# Patient Record
Sex: MALE | Race: BLACK OR AFRICAN AMERICAN
[De-identification: names, ages, dates, MRNs, and addresses within clinical notes are randomized per-mention and may not be internally consistent; named-entity substitution may affect disease eponyms.]

---

## 2018-09-01 ENCOUNTER — HOSPITAL ENCOUNTER (INPATIENT)
Dept: HOSPITAL 61 - ER | Age: 63
LOS: 1 days | Discharge: HOME | DRG: 812 | End: 2018-09-02
Attending: INTERNAL MEDICINE | Admitting: INTERNAL MEDICINE
Payer: COMMERCIAL

## 2018-09-01 VITALS — SYSTOLIC BLOOD PRESSURE: 139 MMHG | DIASTOLIC BLOOD PRESSURE: 72 MMHG

## 2018-09-01 VITALS — WEIGHT: 210 LBS | HEIGHT: 74 IN | BODY MASS INDEX: 26.95 KG/M2

## 2018-09-01 VITALS — SYSTOLIC BLOOD PRESSURE: 127 MMHG | DIASTOLIC BLOOD PRESSURE: 75 MMHG

## 2018-09-01 VITALS — DIASTOLIC BLOOD PRESSURE: 56 MMHG | SYSTOLIC BLOOD PRESSURE: 132 MMHG

## 2018-09-01 VITALS — DIASTOLIC BLOOD PRESSURE: 63 MMHG | SYSTOLIC BLOOD PRESSURE: 115 MMHG

## 2018-09-01 VITALS — SYSTOLIC BLOOD PRESSURE: 133 MMHG | DIASTOLIC BLOOD PRESSURE: 74 MMHG

## 2018-09-01 VITALS — DIASTOLIC BLOOD PRESSURE: 64 MMHG | SYSTOLIC BLOOD PRESSURE: 116 MMHG

## 2018-09-01 VITALS — DIASTOLIC BLOOD PRESSURE: 70 MMHG | SYSTOLIC BLOOD PRESSURE: 125 MMHG

## 2018-09-01 VITALS — SYSTOLIC BLOOD PRESSURE: 135 MMHG | DIASTOLIC BLOOD PRESSURE: 69 MMHG

## 2018-09-01 VITALS — DIASTOLIC BLOOD PRESSURE: 62 MMHG | SYSTOLIC BLOOD PRESSURE: 118 MMHG

## 2018-09-01 VITALS — SYSTOLIC BLOOD PRESSURE: 115 MMHG | DIASTOLIC BLOOD PRESSURE: 63 MMHG

## 2018-09-01 VITALS — DIASTOLIC BLOOD PRESSURE: 72 MMHG | SYSTOLIC BLOOD PRESSURE: 121 MMHG

## 2018-09-01 VITALS — SYSTOLIC BLOOD PRESSURE: 158 MMHG | DIASTOLIC BLOOD PRESSURE: 79 MMHG

## 2018-09-01 VITALS — SYSTOLIC BLOOD PRESSURE: 133 MMHG | DIASTOLIC BLOOD PRESSURE: 71 MMHG

## 2018-09-01 VITALS — DIASTOLIC BLOOD PRESSURE: 61 MMHG | SYSTOLIC BLOOD PRESSURE: 110 MMHG

## 2018-09-01 VITALS — DIASTOLIC BLOOD PRESSURE: 57 MMHG | SYSTOLIC BLOOD PRESSURE: 120 MMHG

## 2018-09-01 DIAGNOSIS — I10: ICD-10-CM

## 2018-09-01 DIAGNOSIS — K21.9: ICD-10-CM

## 2018-09-01 DIAGNOSIS — Z96.642: ICD-10-CM

## 2018-09-01 DIAGNOSIS — D64.9: Primary | ICD-10-CM

## 2018-09-01 DIAGNOSIS — M96.840: ICD-10-CM

## 2018-09-01 DIAGNOSIS — M19.90: ICD-10-CM

## 2018-09-01 DIAGNOSIS — J40: ICD-10-CM
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
## 2018-09-01 LAB
ALBUMIN SERPL-MCNC: 2.9 G/DL (ref 3.4–5)
ALP SERPL-CCNC: 324 U/L (ref 46–116)
ALT SERPL-CCNC: 27 U/L (ref 16–63)
ANION GAP SERPL CALC-SCNC: 9 MMOL/L (ref 6–14)
APTT BLD: 34 SEC (ref 24–38)
APTT PPP: YELLOW S
AST SERPL-CCNC: 22 U/L (ref 15–37)
BACTERIA #/AREA URNS HPF: (no result) /HPF
BASOPHILS # BLD AUTO: 0.1 X10^3/UL (ref 0–0.2)
BASOPHILS NFR BLD: 1 % (ref 0–3)
BILIRUB DIRECT SERPL-MCNC: 0.3 MG/DL (ref 0–0.2)
BILIRUB SERPL-MCNC: 1 MG/DL (ref 0.2–1)
BILIRUB UR QL STRIP: NEGATIVE
BUN SERPL-MCNC: 10 MG/DL (ref 8–26)
CALCIUM SERPL-MCNC: 8.3 MG/DL (ref 8.5–10.1)
CHLORIDE SERPL-SCNC: 102 MMOL/L (ref 98–107)
CO2 SERPL-SCNC: 25 MMOL/L (ref 21–32)
CREAT SERPL-MCNC: 1.2 MG/DL (ref 0.7–1.3)
EOSINOPHIL NFR BLD: 0 % (ref 0–3)
EOSINOPHIL NFR BLD: 0 X10^3/UL (ref 0–0.7)
ERYTHROCYTE [DISTWIDTH] IN BLOOD BY AUTOMATED COUNT: 14.3 % (ref 11.5–14.5)
ERYTHROCYTE [DISTWIDTH] IN BLOOD BY AUTOMATED COUNT: 14.5 % (ref 11.5–14.5)
FECAL OB PT: POSITIVE
FIBRINOGEN PPP-MCNC: CLEAR MG/DL
GFR SERPLBLD BASED ON 1.73 SQ M-ARVRAT: 74.2 ML/MIN
GLUCOSE SERPL-MCNC: 112 MG/DL (ref 70–99)
HCT VFR BLD CALC: 16.4 % (ref 39–53)
HCT VFR BLD CALC: 21.6 % (ref 39–53)
HGB BLD-MCNC: 5.8 G/DL (ref 13–17.5)
HGB BLD-MCNC: 7.7 G/DL (ref 13–17.5)
LYMPHOCYTES # BLD: 1.2 X10^3/UL (ref 1–4.8)
LYMPHOCYTES NFR BLD AUTO: 13 % (ref 24–48)
MCH RBC QN AUTO: 31 PG (ref 25–35)
MCH RBC QN AUTO: 31 PG (ref 25–35)
MCHC RBC AUTO-ENTMCNC: 36 G/DL (ref 31–37)
MCHC RBC AUTO-ENTMCNC: 36 G/DL (ref 31–37)
MCV RBC AUTO: 87 FL (ref 79–100)
MCV RBC AUTO: 87 FL (ref 79–100)
MONO #: 1.6 X10^3/UL (ref 0–1.1)
MONOCYTES NFR BLD: 17 % (ref 0–9)
NEUT #: 6.9 X10^3UL (ref 1.8–7.7)
NEUTROPHILS NFR BLD AUTO: 70 % (ref 31–73)
NITRITE UR QL STRIP: POSITIVE
PH UR STRIP: 6.5 [PH]
PLATELET # BLD AUTO: 360 X10^3/UL (ref 140–400)
PLATELET # BLD AUTO: 372 X10^3/UL (ref 140–400)
POTASSIUM SERPL-SCNC: 3.9 MMOL/L (ref 3.5–5.1)
PROT SERPL-MCNC: 6.9 G/DL (ref 6.4–8.2)
PROT UR STRIP-MCNC: NEGATIVE MG/DL
PROTHROMBIN TIME: 14.9 SEC (ref 11.7–14)
RBC # BLD AUTO: 1.89 X10^6/UL (ref 4.3–5.7)
RBC # BLD AUTO: 2.49 X10^6/UL (ref 4.3–5.7)
RBC #/AREA URNS HPF: (no result) /HPF (ref 0–2)
SODIUM SERPL-SCNC: 136 MMOL/L (ref 136–145)
SQUAMOUS #/AREA URNS LPF: (no result) /LPF
UROBILINOGEN UR-MCNC: 4 MG/DL
WBC # BLD AUTO: 9.6 X10^3/UL (ref 4–11)
WBC # BLD AUTO: 9.8 X10^3/UL (ref 4–11)
WBC #/AREA URNS HPF: (no result) /HPF (ref 0–4)

## 2018-09-01 PROCEDURE — 87086 URINE CULTURE/COLONY COUNT: CPT

## 2018-09-01 PROCEDURE — 82607 VITAMIN B-12: CPT

## 2018-09-01 PROCEDURE — 87186 SC STD MICRODIL/AGAR DIL: CPT

## 2018-09-01 PROCEDURE — P9016 RBC LEUKOCYTES REDUCED: HCPCS

## 2018-09-01 PROCEDURE — 82274 ASSAY TEST FOR BLOOD FECAL: CPT

## 2018-09-01 PROCEDURE — 73700 CT LOWER EXTREMITY W/O DYE: CPT

## 2018-09-01 PROCEDURE — 80076 HEPATIC FUNCTION PANEL: CPT

## 2018-09-01 PROCEDURE — 86850 RBC ANTIBODY SCREEN: CPT

## 2018-09-01 PROCEDURE — 81001 URINALYSIS AUTO W/SCOPE: CPT

## 2018-09-01 PROCEDURE — 83550 IRON BINDING TEST: CPT

## 2018-09-01 PROCEDURE — 83540 ASSAY OF IRON: CPT

## 2018-09-01 PROCEDURE — 80048 BASIC METABOLIC PNL TOTAL CA: CPT

## 2018-09-01 PROCEDURE — 85610 PROTHROMBIN TIME: CPT

## 2018-09-01 PROCEDURE — 85730 THROMBOPLASTIN TIME PARTIAL: CPT

## 2018-09-01 PROCEDURE — 30233N1 TRANSFUSION OF NONAUTOLOGOUS RED BLOOD CELLS INTO PERIPHERAL VEIN, PERCUTANEOUS APPROACH: ICD-10-PCS | Performed by: INTERNAL MEDICINE

## 2018-09-01 PROCEDURE — 86140 C-REACTIVE PROTEIN: CPT

## 2018-09-01 PROCEDURE — 36415 COLL VENOUS BLD VENIPUNCTURE: CPT

## 2018-09-01 PROCEDURE — 86920 COMPATIBILITY TEST SPIN: CPT

## 2018-09-01 PROCEDURE — 85027 COMPLETE CBC AUTOMATED: CPT

## 2018-09-01 PROCEDURE — 86901 BLOOD TYPING SEROLOGIC RH(D): CPT

## 2018-09-01 PROCEDURE — 85025 COMPLETE CBC W/AUTO DIFF WBC: CPT

## 2018-09-01 PROCEDURE — 85651 RBC SED RATE NONAUTOMATED: CPT

## 2018-09-01 PROCEDURE — 85045 AUTOMATED RETICULOCYTE COUNT: CPT

## 2018-09-01 PROCEDURE — 86900 BLOOD TYPING SEROLOGIC ABO: CPT

## 2018-09-01 PROCEDURE — 80053 COMPREHEN METABOLIC PANEL: CPT

## 2018-09-01 PROCEDURE — 82746 ASSAY OF FOLIC ACID SERUM: CPT

## 2018-09-01 RX ADMIN — Medication SCH CAP: at 21:24

## 2018-09-01 NOTE — RAD
CT STUDY OF THE LEFT HIP AND LEFT FEMUR WITHOUT CONTRAST

 

Clinical indications: Left leg and left hip pain status post left hip 

replacement in July 2018. Patient has swelling in the thigh. Anemia.

 

TECHNIQUE: Noncontrast helical CT scanning of the left hip and left femur 

down to the level of the distal femoral metaphysis was performed. 

Multiplanar 2-D reconstructions were generated.

 

PQRS compliance Statement

 

One or more of the following individualized dose reduction techniques were

utilized for this study:

1.  Automated exposure control

2.  Adjustment of the mA and/or kV according to patient size

3.  Use of iterative reconstruction technique

 

COMPARISON: None available.

 

FINDINGS: A left hip arthroplasty is seen which is well aligned. There is 

metallic streaking artifact related to the arthroplasty. There is a 

nondisplaced comminuted spiral fracture of the proximal shaft of the left 

femur. A long femoral stem prosthesis is seen extending through the 

proximal left femoral shaft and intertrochanteric area down to the distal 

shaft level below the level of the fracture. Bone infarct is seen within 

the distal shaft of the left femur distal to the femoral stem prosthesis. 

4 cerclage wires are evident. Fracture fragments are seen anteriorly and 

medially. Some mild callus formation is seen around the upper portion of 

the fracture. Heterotopic soft tissue ossification is seen. There is a 

radiolucent lesion of the roof of the acetabulum anteriorly with 

associated soft tissue component. This lesion measures 4.1 cm transversely

and 2.4 cm in AP dimension and 1.9 cm in vertical dimension including the 

soft tissue component.. There is myositis ossificans laterally within the 

proximal left thigh which measures 10.8 cm in vertical dimension. There is

a fluid collection measuring 8 cm in length just lateral to the greater 

trochanter. This is consistent with greater trochanteric bursitis or 

postoperative hematoma or seroma. There is lateral subcutaneous soft 

tissue edema which may be related to the recent surgery. On images 68-90 

and series 2 and images 38-46 and series 4, there is an ill-defined 

hypodensity present within the biceps femoris muscle. This could be 

related to the streaking artifact from the prosthesis or could be due to 

an intramuscular hematoma related to muscle injury. This is seen at the 

mid thigh level.

 

IMPRESSION: Left hip prosthesis with a long femoral stem. The long femoral

stem and cerclage wires fixates and reduces a comminuted left femoral 

shaft fracture. Mild healing callus formation is evident. The fracture is 

not yet healed.

 

Fluid collection lateral to the greater trochanter consistent greater 

trochanteric bursitis or could represent postoperative hematoma or seroma.

Infection of this fluid collection or abscess cannot be determined by CT.

 

Ill-defined hypodense area within the biceps Glover muscle the mid thigh 

area. This could be related to streaking artifact from the metal 

prosthesis or could represent an intramuscular hematoma secondary to 

muscle injury. Clinical correlation with this area is recommended.

 

Myositis ossificans laterally.

 

Radiolucent lesion of the roof of the acetabulum. Differential 

considerations include metastatic disease or myeloma or infection or 

loosening.

 

Electronically signed by: Dayday Salazar MD (9/1/2018 11:45 AM) [facility]

## 2018-09-01 NOTE — PDOC
Provider Note


Provider Note


Consult noted, labs, chart and CT reviewed. Left hip surgery in July at .  CT 

shows long stem JUDY and femur fracture stabilized with cerclage wires.  lateral 

hematoma on CT series 4 image 39.  Significant anemia hgb 5.8, with 

transfusions planned. Will check CRP and ESR.  Doubtful infection or need for 

ortho surgery but will discuss with him tomorrow with lab results.











KIRILL BERNSTEIN MD Sep 1, 2018 12:56

## 2018-09-01 NOTE — PHYS DOC
Past Medical History


Past Medical History:  Hypertension


Past Surgical History:  Hip Replacement, Other


Additional Past Surgical Histo:  back surgery


Alcohol Use:  Occasionally


Drug Use:  None





Adult General


Chief Complaint


Chief Complaint:  ABNORMAL LABS





[facility]


HPI





Patient is a 63  year old male who presents with anemia.  Patient is status 

post left hip replacement which was completed in July. Since the surgery, the 

patient has had repeated episodes of anemia. He has required blood 

transfusions. Today, he was referred to come to the emergency department for 

documented low blood count. He had labs collected yesterday and his hemoglobin 

was reportedly 6. Patient has no complaints. No pain. No palpitations, 

lightheadedness. No fever. He does complain however of some mild soft tissue 

swelling about the incision on the left lateral thigh.  Denies melena or 

hematochezia.





Review of Systems


Review of Systems





Constitutional: Denies fever or chills


Eyes: Denies change in visual acuity, redness


HENT: Denies nasal congestion or sore throat


Respiratory: Denies cough or shortness of breath 


Cardiovascular: No additional information not addressed in HPI 


GI: Denies abdominal pain, nausea


: Denies dysuria or hematuria


Musculoskeletal: Denies back pain 


Integument: Denies rash or skin lesions 


Neurologic: Denies headache


Endocrine: Denies polyuria





All other systems were reviewed and found to be within normal limits, except as 

documented in this note.





Allergies


Allergies





Allergies








Coded Allergies Type Severity Reaction Last Updated Verified


 


  No Known Drug Allergies    9/1/18 No











Physical Exam


Physical Exam





Constitutional: Well developed, well nourished, no acute distress


HENT: Normocephalic, atraumatic, bilateral external ears normal, oropharynx 

moist, no oral exudates


Eyes: PERRLA, EOMI, conjunctiva pale


Neck: Normal range of motion, no tenderness 


Cardiovascular:Heart rate regular rhythm, no murmur 


Lungs & Thorax:  Bilateral breath sounds clear to auscultation


Abdomen: Bowel sounds normal, soft 


Skin: Warm, dry, no erythema,   


Extremities: Well-healing incision over the lateral left thigh. There is some 

soft tissue firmness about the incision but no overt fluctuant areas. No 

erythema. No dehiscence or drainage. 


Neurologic: Alert and oriented X 3, normal motor function, normal sensory 

function


Psychologic: Affect normal, judgement normal, mood normal





Current Patient Data


Vital Signs





 Vital Signs








  Date Time  Temp Pulse Resp B/P (MAP) Pulse Ox O2 Delivery O2 Flow Rate FiO2


 


9/1/18 09:18 99.2 93 12 120/62 (81) 96 Room Air  





 99.2       








Lab Values





 Laboratory Tests








Test


 9/1/18


09:36


 


White Blood Count


 9.8 x10^3/uL


(4.0-11.0)


 


Red Blood Count


 1.89 x10^6/uL


(4.30-5.70)  L


 


Hemoglobin


 5.8 g/dL


(13.0-17.5)  *L


 


Hematocrit


 16.4 %


(39.0-53.0)  *L


 


Mean Corpuscular Volume


 87 fL ()





 


Mean Corpuscular Hemoglobin 31 pg (25-35)  


 


Mean Corpuscular Hemoglobin


Concent 36 g/dL


(31-37)


 


Red Cell Distribution Width


 14.3 %


(11.5-14.5)


 


Platelet Count


 372 x10^3/uL


(140-400)


 


Neutrophils (%) (Auto) 70 % (31-73)  


 


Lymphocytes (%) (Auto) 13 % (24-48)  L


 


Monocytes (%) (Auto) 17 % (0-9)  H


 


Eosinophils (%) (Auto) 0 % (0-3)  


 


Basophils (%) (Auto) 1 % (0-3)  


 


Neutrophils # (Auto)


 6.9 x10^3uL


(1.8-7.7)


 


Lymphocytes # (Auto)


 1.2 x10^3/uL


(1.0-4.8)


 


Monocytes # (Auto)


 1.6 x10^3/uL


(0.0-1.1)  H


 


Eosinophils # (Auto)


 0.0 x10^3/uL


(0.0-0.7)


 


Basophils # (Auto)


 0.1 x10^3/uL


(0.0-0.2)


 


Prothrombin Time


 14.9 SEC


(11.7-14.0)  H


 


Prothrombin Time INR


 1.2 (0.8-1.1)


H


 


PTT


 34 SEC (24-38)





 


Sodium Level


 136 mmol/L


(136-145)


 


Potassium Level


 3.9 mmol/L


(3.5-5.1)


 


Chloride Level


 102 mmol/L


()


 


Carbon Dioxide Level


 25 mmol/L


(21-32)


 


Anion Gap 9 (6-14)  


 


Blood Urea Nitrogen


 10 mg/dL


(8-26)


 


Creatinine


 1.2 mg/dL


(0.7-1.3)


 


Estimated GFR


(Cockcroft-Gault) 74.2  





 


Glucose Level


 112 mg/dL


(70-99)  H


 


Calcium Level


 8.3 mg/dL


(8.5-10.1)  L


 


Total Bilirubin


 1.0 mg/dL


(0.2-1.0)


 


Direct Bilirubin


 0.3 mg/dL


(0.0-0.2)  H


 


Aspartate Amino Transferase


(AST) 22 U/L (15-37)





 


Alanine Aminotransferase (ALT)


 27 U/L (16-63)





 


Alkaline Phosphatase


 324 U/L


()  H


 


Total Protein


 6.9 g/dL


(6.4-8.2)


 


Albumin


 2.9 g/dL


(3.4-5.0)  L





 Laboratory Tests


9/1/18 09:36








 Laboratory Tests


9/1/18 09:36














EKG


EKG


[]





Radiology/Procedures


Radiology/Procedures


[]





Course & Med Decision Making


Course & Med Decision Making


Pertinent Labs and Imaging studies reviewed. (See chart for details)





Patient was seen and evaluated in the ER for anemia.  He was originally 

referred to the hospital for outpatient blood transfusion which is not an 

option on the weekend.  Subsequently, he will be admitted to the hospital. His 

hemoglobin is documented above and is low. Patient is relatively asymptomatic. 

He was noted to have positive guaiac test on his stool, which she had 

previously not been aware of. GI consultation is placed. Patient is agreeable 

to the plan of care. Orders are placed for 2 units of packed red blood cells to 

be transfused.  Discussed with Dr. Elizabeth who will primarily admit the patient.





Dragon Disclaimer


Dragon Disclaimer


This electronic medical record was generated, in whole or in part, using a 

voice recognition dictation system.





Departure


Departure


Referrals:  


BOBBY GUERRA MD (PCP)











LORENA PERERA DO Sep 1, 2018 10:02

## 2018-09-01 NOTE — PDOC2
CONSULT


Date of Consult


Date of Consult


DATE: 9/1/18 


TIME: 12:16





Reason for Consult


Reason for Consult:


Anemia s/p hip replacement





Current Problem List


Problem List


Problems


Medical Problems:


(1) Anemia


Status: Acute  





(2) GI bleed


Status: Acute  





(3) Urinary tract infection


Status: Acute  











Current Medications


Current Medications





Current Medications


Acetaminophen (Tylenol) 1,000 mg 1X  ONCE PO  Last administered on 9/1/18at 10:

38;  Start 9/1/18 at 10:45;  Stop 9/1/18 at 10:46;  Status DC


Ondansetron HCl (Zofran) 4 mg PRN Q8HRS  PRN IV NAUSEA/VOMITING;  Start 9/1/18 

at 11:15;  Stop 9/1/18 at 12:04;  Status DC


Acetaminophen (Tylenol) 650 mg PRN Q4HRS  PRN PO FEVER;  Start 9/1/18 at 11:15;

  Stop 9/2/18 at 11:14


Levofloxacin (Levaquin) 500 mg DAILY06 PO ;  Start 9/1/18 at 12:00;  Stop 9/1/ 18 at 12:04;  Status DC


Lactobacillus Rhamnosus (Culturelle) 1 cap BID PO ;  Start 9/1/18 at 21:00


Ondansetron HCl (Zofran) 4 mg PRN Q6HRS  PRN IV NAUSEA/VOMITING;  Start 9/1/18 

at 12:15;  Status UNV


Levofloxacin/ Dextrose (Levaquin Per Pharmacy) 1 each PRN DAILY  PRN MC SEE 

COMMENTS;  Start 9/1/18 at 12:15;  Status UNV


Morphine Sulfate (Morphine Sulfate) 2 mg PRN Q2HR  PRN IV PAIN;  Start 9/1/18 

at 12:15;  Status UNV


Acetaminophen/ Hydrocodone Bitart (Lortab 5/325) 1 tab PRN Q4HRS  PRN PO PAIN;  

Start 9/1/18 at 12:15;  Status UNV


Diphenhydramine HCl (Benadryl) 25 mg PRN QHS  PRN PO INSOMNIA;  Start 9/1/18 at 

12:15;  Status UNV





Allergies


Allergies:  


Coded Allergies:  


     No Known Drug Allergies (Unverified , 9/1/18)





Vitals


VITALS





Vital Signs








  Date Time  Temp Pulse Resp B/P (MAP) Pulse Ox O2 Delivery O2 Flow Rate FiO2


 


9/1/18 11:26 98.8 88 20 116/64    





 98.8       


 


9/1/18 11:18     94 Room Air  











Labs


Labs





Laboratory Tests








Test


 9/1/18


09:36 9/1/18


10:27 9/1/18


10:41


 


White Blood Count


 9.8 x10^3/uL


(4.0-11.0) 


 





 


Red Blood Count


 1.89 x10^6/uL


(4.30-5.70) 


 





 


Hemoglobin


 5.8 g/dL


(13.0-17.5) 


 





 


Hematocrit


 16.4 %


(39.0-53.0) 


 





 


Mean Corpuscular Volume 87 fL ()   


 


Mean Corpuscular Hemoglobin 31 pg (25-35)   


 


Mean Corpuscular Hemoglobin


Concent 36 g/dL


(31-37) 


 





 


Red Cell Distribution Width


 14.3 %


(11.5-14.5) 


 





 


Platelet Count


 372 x10^3/uL


(140-400) 


 





 


Neutrophils (%) (Auto) 70 % (31-73)   


 


Lymphocytes (%) (Auto) 13 % (24-48)   


 


Monocytes (%) (Auto) 17 % (0-9)   


 


Eosinophils (%) (Auto) 0 % (0-3)   


 


Basophils (%) (Auto) 1 % (0-3)   


 


Neutrophils # (Auto)


 6.9 x10^3uL


(1.8-7.7) 


 





 


Lymphocytes # (Auto)


 1.2 x10^3/uL


(1.0-4.8) 


 





 


Monocytes # (Auto)


 1.6 x10^3/uL


(0.0-1.1) 


 





 


Eosinophils # (Auto)


 0.0 x10^3/uL


(0.0-0.7) 


 





 


Basophils # (Auto)


 0.1 x10^3/uL


(0.0-0.2) 


 





 


Prothrombin Time


 14.9 SEC


(11.7-14.0) 


 





 


Prothromb Time International


Ratio 1.2 (0.8-1.1) 


 


 





 


Activated Partial


Thromboplast Time 34 SEC (24-38) 


 


 





 


Sodium Level


 136 mmol/L


(136-145) 


 





 


Potassium Level


 3.9 mmol/L


(3.5-5.1) 


 





 


Chloride Level


 102 mmol/L


() 


 





 


Carbon Dioxide Level


 25 mmol/L


(21-32) 


 





 


Anion Gap 9 (6-14)   


 


Blood Urea Nitrogen


 10 mg/dL


(8-26) 


 





 


Creatinine


 1.2 mg/dL


(0.7-1.3) 


 





 


Estimated GFR


(Cockcroft-Gault) 74.2 


 


 





 


Glucose Level


 112 mg/dL


(70-99) 


 





 


Calcium Level


 8.3 mg/dL


(8.5-10.1) 


 





 


Total Bilirubin


 1.0 mg/dL


(0.2-1.0) 


 





 


Direct Bilirubin


 0.3 mg/dL


(0.0-0.2) 


 





 


Aspartate Amino Transf


(AST/SGOT) 22 U/L (15-37) 


 


 





 


Alanine Aminotransferase


(ALT/SGPT) 27 U/L (16-63) 


 


 





 


Alkaline Phosphatase


 324 U/L


() 


 





 


Total Protein


 6.9 g/dL


(6.4-8.2) 


 





 


Albumin


 2.9 g/dL


(3.4-5.0) 


 





 


Urine Collection Type  Void  


 


Urine Color  Yellow  


 


Urine Clarity  Clear  


 


Urine pH  6.5  


 


Urine Specific Gravity  1.010  


 


Urine Protein


 


 Negative mg/dL


(NEG-TRACE) 





 


Urine Glucose (UA)


 


 Negative mg/dL


(NEG) 





 


Urine Ketones (Stick)


 


 Negative mg/dL


(NEG) 





 


Urine Blood  Negative (NEG)  


 


Urine Nitrite  Positive (NEG)  


 


Urine Bilirubin  Negative (NEG)  


 


Urine Urobilinogen Dipstick


 


 4.0 mg/dL (0.2


mg/dL) 





 


Urine Leukocyte Esterase  Moderate (NEG)  


 


Urine RBC  Occ /HPF (0-2)  


 


Urine WBC


 


 20-40 /HPF


(0-4) 





 


Urine Squamous Epithelial


Cells 


 Few /LPF 


 





 


Urine Bacteria


 


 Many /HPF


(0-FEW) 





 


Stool Occult Blood   Positive (NEG) 








Laboratory Tests








Test


 9/1/18


09:36 9/1/18


10:27 9/1/18


10:41


 


White Blood Count


 9.8 x10^3/uL


(4.0-11.0) 


 





 


Red Blood Count


 1.89 x10^6/uL


(4.30-5.70) 


 





 


Hemoglobin


 5.8 g/dL


(13.0-17.5) 


 





 


Hematocrit


 16.4 %


(39.0-53.0) 


 





 


Mean Corpuscular Volume 87 fL ()   


 


Mean Corpuscular Hemoglobin 31 pg (25-35)   


 


Mean Corpuscular Hemoglobin


Concent 36 g/dL


(31-37) 


 





 


Red Cell Distribution Width


 14.3 %


(11.5-14.5) 


 





 


Platelet Count


 372 x10^3/uL


(140-400) 


 





 


Neutrophils (%) (Auto) 70 % (31-73)   


 


Lymphocytes (%) (Auto) 13 % (24-48)   


 


Monocytes (%) (Auto) 17 % (0-9)   


 


Eosinophils (%) (Auto) 0 % (0-3)   


 


Basophils (%) (Auto) 1 % (0-3)   


 


Neutrophils # (Auto)


 6.9 x10^3uL


(1.8-7.7) 


 





 


Lymphocytes # (Auto)


 1.2 x10^3/uL


(1.0-4.8) 


 





 


Monocytes # (Auto)


 1.6 x10^3/uL


(0.0-1.1) 


 





 


Eosinophils # (Auto)


 0.0 x10^3/uL


(0.0-0.7) 


 





 


Basophils # (Auto)


 0.1 x10^3/uL


(0.0-0.2) 


 





 


Prothrombin Time


 14.9 SEC


(11.7-14.0) 


 





 


Prothromb Time International


Ratio 1.2 (0.8-1.1) 


 


 





 


Activated Partial


Thromboplast Time 34 SEC (24-38) 


 


 





 


Sodium Level


 136 mmol/L


(136-145) 


 





 


Potassium Level


 3.9 mmol/L


(3.5-5.1) 


 





 


Chloride Level


 102 mmol/L


() 


 





 


Carbon Dioxide Level


 25 mmol/L


(21-32) 


 





 


Anion Gap 9 (6-14)   


 


Blood Urea Nitrogen


 10 mg/dL


(8-26) 


 





 


Creatinine


 1.2 mg/dL


(0.7-1.3) 


 





 


Estimated GFR


(Cockcroft-Gault) 74.2 


 


 





 


Glucose Level


 112 mg/dL


(70-99) 


 





 


Calcium Level


 8.3 mg/dL


(8.5-10.1) 


 





 


Total Bilirubin


 1.0 mg/dL


(0.2-1.0) 


 





 


Direct Bilirubin


 0.3 mg/dL


(0.0-0.2) 


 





 


Aspartate Amino Transf


(AST/SGOT) 22 U/L (15-37) 


 


 





 


Alanine Aminotransferase


(ALT/SGPT) 27 U/L (16-63) 


 


 





 


Alkaline Phosphatase


 324 U/L


() 


 





 


Total Protein


 6.9 g/dL


(6.4-8.2) 


 





 


Albumin


 2.9 g/dL


(3.4-5.0) 


 





 


Urine Collection Type  Void  


 


Urine Color  Yellow  


 


Urine Clarity  Clear  


 


Urine pH  6.5  


 


Urine Specific Gravity  1.010  


 


Urine Protein


 


 Negative mg/dL


(NEG-TRACE) 





 


Urine Glucose (UA)


 


 Negative mg/dL


(NEG) 





 


Urine Ketones (Stick)


 


 Negative mg/dL


(NEG) 





 


Urine Blood  Negative (NEG)  


 


Urine Nitrite  Positive (NEG)  


 


Urine Bilirubin  Negative (NEG)  


 


Urine Urobilinogen Dipstick


 


 4.0 mg/dL (0.2


mg/dL) 





 


Urine Leukocyte Esterase  Moderate (NEG)  


 


Urine RBC  Occ /HPF (0-2)  


 


Urine WBC


 


 20-40 /HPF


(0-4) 





 


Urine Squamous Epithelial


Cells 


 Few /LPF 


 





 


Urine Bacteria


 


 Many /HPF


(0-FEW) 





 


Stool Occult Blood   Positive (NEG) 











Assessment/Plan


Assessment/Plan


Anemia- etiology to be determined. Gastric/colon cancer, AVMS, celaic disease, 

and/or IBD with weight loss in differential as well as marrow dysfunction


Plan transfuse 3 units PRBC


       advance diet


       await b12/retic/iron/foalte stores


       heme consult pending above. Possible Bm biopsy and/or endoscopy to 

further assess


Full note dictated











ROHINI DENTON MD Sep 1, 2018 12:19

## 2018-09-01 NOTE — PDOC1
History and Physical


Date of Admission


Date of Admission


DATE: 9/1/18 


TIME: 12:04





Identification/Chief Complaint


Chief Complaint


Black stools





Source


Source:  Caregiver, Chart review, Patient





History of Present Illness


History of Present Illness


Pleasant 63-year-old African-American male, recent hip surgery left at  some 

2 months ago. Loots of blood loss nory/intra op, required multiple blood 

transfusions at , comes in because of black stools with a hemoglobin of 5.8 

on admission. GI consulted, Hemoccult positive. To transfuse 3 packed RBCs. 

Possible need scope in house during this admission. So far blood pressure and 

heart rate good. No abdominal pain. CAT scan of the left pelvis hip was done, 

he has been complaining of some increased pain there. He is healing well on the 

outside, but there is a mention of postop hematoma or seroma seen on CAT scan. 

Hence I'm consulting orthopedics. GI Was consulted.


Follows with Dr. Claire Humphrey for the anemia. We'll consult the same group 

otherwise. Otherwise just takes only Tylenol at home, not on any NSAIDs. No 

cardio- pulmonary problems. Nonsmoker nonalcoholic drinker


No known drug allergies, AMbulates with a cane at home since the hip fx.





Past Medical History


Cardiovascular:  No pertinent hx


Pulmonary:  Bronchitis


GI:  No pertinent hx


Heme/Onc:  No pertinent hx


Hepatobiliary:  No pertinent hx


Musculoskeletal:  Osteoarthritis





Past Surgical History


Past Surgical History:  Total hip replacement





Family History


Family History:  No Significant





Social History


Smoke:  No


ALCOHOL:  none


Drugs:  None





Current Problem List


Problem List


Problems


Medical Problems:


(1) Anemia


Status: Acute  





(2) GI bleed


Status: Acute  





(3) Urinary tract infection


Status: Acute  











Current Medications


Current Medications





Current Medications


Acetaminophen (Tylenol) 1,000 mg 1X  ONCE PO  Last administered on 9/1/18at 10:

38;  Start 9/1/18 at 10:45;  Stop 9/1/18 at 10:46;  Status DC


Ondansetron HCl (Zofran) 4 mg PRN Q8HRS  PRN IV NAUSEA/VOMITING;  Start 9/1/18 

at 11:15;  Stop 9/2/18 at 11:14


Acetaminophen (Tylenol) 650 mg PRN Q4HRS  PRN PO FEVER;  Start 9/1/18 at 11:15;

  Stop 9/2/18 at 11:14


Levofloxacin (Levaquin) 500 mg DAILY06 PO ;  Start 9/1/18 at 12:00


Lactobacillus Rhamnosus (Culturelle) 1 cap BID PO ;  Start 9/1/18 at 21:00





Allergies


Allergies:  


Coded Allergies:  


     No Known Drug Allergies (Unverified , 9/1/18)





ROS


Review of System


Left hip pain, black stools, the rest of ROS 14 point negative





Physical Exam


General:  Alert, Oriented X3, Cooperative, No acute distress


HEENT:  Atraumatic, PERRLA, EOMI, Other (pale palpebral conjunctivae)


Lungs:  Clear to auscultation


Heart:  S1S2, RRR, no thrills, no rubs, no gallops, no murmurs


Cardiovascular:  S1, S2


Abdomen:  Normal bowel sounds, Soft, No tenderness, No hepatosplenomegaly, No 

masses


Male Genitals Exam:  normal genitalia, normal prostate


PELVIC:  Other (left hip, scar, healing well)


Extremities:  No clubbing, No cyanosis, No edema


Skin:  No rashes, No breakdown, No significant lesion


Neuro:  Normal gait, Normal speech, Strength at 5/5 X4 ext, Normal tone, 

Sensation intact, Cranial nerves 3-12 NL, Reflexes 2+


Psych/Mental Status:  Mental status NL, Mood NL





Vitals


Vitals





Vital Signs








  Date Time  Temp Pulse Resp B/P (MAP) Pulse Ox O2 Delivery O2 Flow Rate FiO2


 


9/1/18 11:26 98.8 88 20 116/64    





 98.8       


 


9/1/18 11:18     94 Room Air  











Labs


Labs





Laboratory Tests








Test


 9/1/18


09:36 9/1/18


10:27 9/1/18


10:41


 


White Blood Count


 9.8 x10^3/uL


(4.0-11.0) 


 





 


Red Blood Count


 1.89 x10^6/uL


(4.30-5.70) 


 





 


Hemoglobin


 5.8 g/dL


(13.0-17.5) 


 





 


Hematocrit


 16.4 %


(39.0-53.0) 


 





 


Mean Corpuscular Volume 87 fL ()   


 


Mean Corpuscular Hemoglobin 31 pg (25-35)   


 


Mean Corpuscular Hemoglobin


Concent 36 g/dL


(31-37) 


 





 


Red Cell Distribution Width


 14.3 %


(11.5-14.5) 


 





 


Platelet Count


 372 x10^3/uL


(140-400) 


 





 


Neutrophils (%) (Auto) 70 % (31-73)   


 


Lymphocytes (%) (Auto) 13 % (24-48)   


 


Monocytes (%) (Auto) 17 % (0-9)   


 


Eosinophils (%) (Auto) 0 % (0-3)   


 


Basophils (%) (Auto) 1 % (0-3)   


 


Neutrophils # (Auto)


 6.9 x10^3uL


(1.8-7.7) 


 





 


Lymphocytes # (Auto)


 1.2 x10^3/uL


(1.0-4.8) 


 





 


Monocytes # (Auto)


 1.6 x10^3/uL


(0.0-1.1) 


 





 


Eosinophils # (Auto)


 0.0 x10^3/uL


(0.0-0.7) 


 





 


Basophils # (Auto)


 0.1 x10^3/uL


(0.0-0.2) 


 





 


Prothrombin Time


 14.9 SEC


(11.7-14.0) 


 





 


Prothromb Time International


Ratio 1.2 (0.8-1.1) 


 


 





 


Activated Partial


Thromboplast Time 34 SEC (24-38) 


 


 





 


Sodium Level


 136 mmol/L


(136-145) 


 





 


Potassium Level


 3.9 mmol/L


(3.5-5.1) 


 





 


Chloride Level


 102 mmol/L


() 


 





 


Carbon Dioxide Level


 25 mmol/L


(21-32) 


 





 


Anion Gap 9 (6-14)   


 


Blood Urea Nitrogen


 10 mg/dL


(8-26) 


 





 


Creatinine


 1.2 mg/dL


(0.7-1.3) 


 





 


Estimated GFR


(Cockcroft-Gault) 74.2 


 


 





 


Glucose Level


 112 mg/dL


(70-99) 


 





 


Calcium Level


 8.3 mg/dL


(8.5-10.1) 


 





 


Total Bilirubin


 1.0 mg/dL


(0.2-1.0) 


 





 


Direct Bilirubin


 0.3 mg/dL


(0.0-0.2) 


 





 


Aspartate Amino Transf


(AST/SGOT) 22 U/L (15-37) 


 


 





 


Alanine Aminotransferase


(ALT/SGPT) 27 U/L (16-63) 


 


 





 


Alkaline Phosphatase


 324 U/L


() 


 





 


Total Protein


 6.9 g/dL


(6.4-8.2) 


 





 


Albumin


 2.9 g/dL


(3.4-5.0) 


 





 


Urine Collection Type  Void  


 


Urine Color  Yellow  


 


Urine Clarity  Clear  


 


Urine pH  6.5  


 


Urine Specific Gravity  1.010  


 


Urine Protein


 


 Negative mg/dL


(NEG-TRACE) 





 


Urine Glucose (UA)


 


 Negative mg/dL


(NEG) 





 


Urine Ketones (Stick)


 


 Negative mg/dL


(NEG) 





 


Urine Blood  Negative (NEG)  


 


Urine Nitrite  Positive (NEG)  


 


Urine Bilirubin  Negative (NEG)  


 


Urine Urobilinogen Dipstick


 


 4.0 mg/dL (0.2


mg/dL) 





 


Urine Leukocyte Esterase  Moderate (NEG)  


 


Urine RBC  Occ /HPF (0-2)  


 


Urine WBC


 


 20-40 /HPF


(0-4) 





 


Urine Squamous Epithelial


Cells 


 Few /LPF 


 





 


Urine Bacteria


 


 Many /HPF


(0-FEW) 





 


Stool Occult Blood   Positive (NEG) 








Laboratory Tests








Test


 9/1/18


09:36 9/1/18


10:27 9/1/18


10:41


 


White Blood Count


 9.8 x10^3/uL


(4.0-11.0) 


 





 


Red Blood Count


 1.89 x10^6/uL


(4.30-5.70) 


 





 


Hemoglobin


 5.8 g/dL


(13.0-17.5) 


 





 


Hematocrit


 16.4 %


(39.0-53.0) 


 





 


Mean Corpuscular Volume 87 fL ()   


 


Mean Corpuscular Hemoglobin 31 pg (25-35)   


 


Mean Corpuscular Hemoglobin


Concent 36 g/dL


(31-37) 


 





 


Red Cell Distribution Width


 14.3 %


(11.5-14.5) 


 





 


Platelet Count


 372 x10^3/uL


(140-400) 


 





 


Neutrophils (%) (Auto) 70 % (31-73)   


 


Lymphocytes (%) (Auto) 13 % (24-48)   


 


Monocytes (%) (Auto) 17 % (0-9)   


 


Eosinophils (%) (Auto) 0 % (0-3)   


 


Basophils (%) (Auto) 1 % (0-3)   


 


Neutrophils # (Auto)


 6.9 x10^3uL


(1.8-7.7) 


 





 


Lymphocytes # (Auto)


 1.2 x10^3/uL


(1.0-4.8) 


 





 


Monocytes # (Auto)


 1.6 x10^3/uL


(0.0-1.1) 


 





 


Eosinophils # (Auto)


 0.0 x10^3/uL


(0.0-0.7) 


 





 


Basophils # (Auto)


 0.1 x10^3/uL


(0.0-0.2) 


 





 


Prothrombin Time


 14.9 SEC


(11.7-14.0) 


 





 


Prothromb Time International


Ratio 1.2 (0.8-1.1) 


 


 





 


Activated Partial


Thromboplast Time 34 SEC (24-38) 


 


 





 


Sodium Level


 136 mmol/L


(136-145) 


 





 


Potassium Level


 3.9 mmol/L


(3.5-5.1) 


 





 


Chloride Level


 102 mmol/L


() 


 





 


Carbon Dioxide Level


 25 mmol/L


(21-32) 


 





 


Anion Gap 9 (6-14)   


 


Blood Urea Nitrogen


 10 mg/dL


(8-26) 


 





 


Creatinine


 1.2 mg/dL


(0.7-1.3) 


 





 


Estimated GFR


(Cockcroft-Gault) 74.2 


 


 





 


Glucose Level


 112 mg/dL


(70-99) 


 





 


Calcium Level


 8.3 mg/dL


(8.5-10.1) 


 





 


Total Bilirubin


 1.0 mg/dL


(0.2-1.0) 


 





 


Direct Bilirubin


 0.3 mg/dL


(0.0-0.2) 


 





 


Aspartate Amino Transf


(AST/SGOT) 22 U/L (15-37) 


 


 





 


Alanine Aminotransferase


(ALT/SGPT) 27 U/L (16-63) 


 


 





 


Alkaline Phosphatase


 324 U/L


() 


 





 


Total Protein


 6.9 g/dL


(6.4-8.2) 


 





 


Albumin


 2.9 g/dL


(3.4-5.0) 


 





 


Urine Collection Type  Void  


 


Urine Color  Yellow  


 


Urine Clarity  Clear  


 


Urine pH  6.5  


 


Urine Specific Gravity  1.010  


 


Urine Protein


 


 Negative mg/dL


(NEG-TRACE) 





 


Urine Glucose (UA)


 


 Negative mg/dL


(NEG) 





 


Urine Ketones (Stick)


 


 Negative mg/dL


(NEG) 





 


Urine Blood  Negative (NEG)  


 


Urine Nitrite  Positive (NEG)  


 


Urine Bilirubin  Negative (NEG)  


 


Urine Urobilinogen Dipstick


 


 4.0 mg/dL (0.2


mg/dL) 





 


Urine Leukocyte Esterase  Moderate (NEG)  


 


Urine RBC  Occ /HPF (0-2)  


 


Urine WBC


 


 20-40 /HPF


(0-4) 





 


Urine Squamous Epithelial


Cells 


 Few /LPF 


 





 


Urine Bacteria


 


 Many /HPF


(0-FEW) 





 


Stool Occult Blood   Positive (NEG) 











VTE Prophylaxis Ordered


VTE Prophylaxis Devices:  Contraindicated


VTE Pharmacological Prophylaxi:  Contraindicated





Assessment/Plan


Assessment/Plan


Acute precipitous drop in hemoglobin


Hemoccult-positive


Melena


Recent hip surgery 2 months ago


Postop seroma or hematoma left hip


Abnormal CT leg, rule out metastatic process





PLAN:


Admit CVC


Transfuse 3 pRBC


Hemoglobin tomorrow


GI consulted


PPI


ortho consult for the above abnormal CT leg


Heme onc consulted for the abnormal CT leg with significant anemia, rule out 

metastatic process


Tylenol only is his home medication


PT OT


Discussed with the whole family and RN at bedside











FLORIDA RUELAS MD Sep 1, 2018 12:04

## 2018-09-02 VITALS — SYSTOLIC BLOOD PRESSURE: 130 MMHG | DIASTOLIC BLOOD PRESSURE: 75 MMHG

## 2018-09-02 VITALS — DIASTOLIC BLOOD PRESSURE: 87 MMHG | SYSTOLIC BLOOD PRESSURE: 144 MMHG

## 2018-09-02 VITALS — SYSTOLIC BLOOD PRESSURE: 124 MMHG | DIASTOLIC BLOOD PRESSURE: 68 MMHG

## 2018-09-02 VITALS — SYSTOLIC BLOOD PRESSURE: 128 MMHG | DIASTOLIC BLOOD PRESSURE: 75 MMHG

## 2018-09-02 LAB
ALBUMIN SERPL-MCNC: 2.6 G/DL (ref 3.4–5)
ALBUMIN/GLOB SERPL: 0.7 {RATIO} (ref 1–1.7)
ALP SERPL-CCNC: 295 U/L (ref 46–116)
ALT SERPL-CCNC: 26 U/L (ref 16–63)
ANION GAP SERPL CALC-SCNC: 8 MMOL/L (ref 6–14)
AST SERPL-CCNC: 22 U/L (ref 15–37)
BASOPHILS # BLD AUTO: 0 X10^3/UL (ref 0–0.2)
BASOPHILS NFR BLD: 0 % (ref 0–3)
BILIRUB SERPL-MCNC: 0.7 MG/DL (ref 0.2–1)
BUN SERPL-MCNC: 12 MG/DL (ref 8–26)
BUN/CREAT SERPL: 11 (ref 6–20)
CALCIUM SERPL-MCNC: 8.1 MG/DL (ref 8.5–10.1)
CHLORIDE SERPL-SCNC: 106 MMOL/L (ref 98–107)
CO2 SERPL-SCNC: 24 MMOL/L (ref 21–32)
CREAT SERPL-MCNC: 1.1 MG/DL (ref 0.7–1.3)
EOSINOPHIL NFR BLD: 0.1 X10^3/UL (ref 0–0.7)
EOSINOPHIL NFR BLD: 2 % (ref 0–3)
ERYTHROCYTE [DISTWIDTH] IN BLOOD BY AUTOMATED COUNT: 14.7 % (ref 11.5–14.5)
GFR SERPLBLD BASED ON 1.73 SQ M-ARVRAT: 82.1 ML/MIN
GLOBULIN SER-MCNC: 3.8 G/DL (ref 2.2–3.8)
GLUCOSE SERPL-MCNC: 106 MG/DL (ref 70–99)
HCT VFR BLD CALC: 23.5 % (ref 39–53)
HGB BLD-MCNC: 8.5 G/DL (ref 13–17.5)
LYMPHOCYTES # BLD: 2.1 X10^3/UL (ref 1–4.8)
LYMPHOCYTES NFR BLD AUTO: 24 % (ref 24–48)
MCH RBC QN AUTO: 31 PG (ref 25–35)
MCHC RBC AUTO-ENTMCNC: 36 G/DL (ref 31–37)
MCV RBC AUTO: 86 FL (ref 79–100)
MONO #: 1.3 X10^3/UL (ref 0–1.1)
MONOCYTES NFR BLD: 16 % (ref 0–9)
NEUT #: 5.1 X10^3UL (ref 1.8–7.7)
NEUTROPHILS NFR BLD AUTO: 59 % (ref 31–73)
PLATELET # BLD AUTO: 323 X10^3/UL (ref 140–400)
POTASSIUM SERPL-SCNC: 4.1 MMOL/L (ref 3.5–5.1)
PROT SERPL-MCNC: 6.4 G/DL (ref 6.4–8.2)
RBC # BLD AUTO: 2.73 X10^6/UL (ref 4.3–5.7)
SODIUM SERPL-SCNC: 138 MMOL/L (ref 136–145)
WBC # BLD AUTO: 8.6 X10^3/UL (ref 4–11)

## 2018-09-02 RX ADMIN — Medication SCH CAP: at 11:28

## 2018-09-02 NOTE — CONS
DATE OF CONSULTATION:  09/01/2018



REASON FOR CONSULTATION:  Anemia, status post hip replacement.



HISTORY OF PRESENT ILLNESS:  A 63-year-old -American male with past

medical history significant for hypertension, osteoarthrosis, status post hip

repair with prior replacement, had persistent anemia and has required

transfusions.  Denies any melena and/or hematochezia.  Not undergone upper

endoscopy and colonoscopy, has lost approximately 50 pounds; however, in the

past year.  Denies family history of colon polyps or colon cancer, dysphagia,

odynophagia, significant heartburn and feels dyspneic with exertion with low

counts and is here for transfusion support and potential further workup.



PAST MEDICAL HISTORY:  History of hypertension, anemia, status post hip

replacement and osteoarthrosis.



ALLERGIES:  None.



MEDICATIONS:  Presently include Benadryl, hydrocodone and levofloxacin.



FAMILY AND SOCIAL HISTORY:  Does not drink or smoke in excess.  Family history

is noncontributory.



REVIEW OF SYSTEMS:  As per records.



PHYSICAL EXAMINATION:

GENERAL:  Reveals a well-nourished, well-developed  male.

VITAL SIGNS:  Temperature is 98.8, pulse 88, respiratory rate 20 and blood

pressure 118/64.

HEENT:  Reveals normocephalic and atraumatic head.  Pupils and extraocular

muscles are not tested.  Sclerae anicteric.

NECK:  Supple.

LUNGS:  Clear.

CARDIOVASCULAR:  Reveals an S1 and S2 without S3, S4 or appreciable murmur.

ABDOMEN:  Reveals soft abdomen, normal bowel sounds without appreciable

hepatosplenomegaly.

EXTREMITIES:  Reveals no cyanosis, clubbing or edema.



LABORATORY STUDIES:  Sodium 136, potassium 3.9, chloride 102, BUN 10, creatinine

1.2, glucose 112, calcium 8.3, total bilirubin 1.0, direct bilirubin 0.3, AST

22, ALT of 27, alkaline phosphatase 324, total protein 6.9, albumin 2.9. 

Hemoglobin 5.8 and hematocrit 16.4, white count 9.8 and platelet count 372,000. 

Iron stores, B12, retic and folate levels presently are pending.



IMPRESSION:  Anemia, status post hip replacement and weight loss etiology is to

be determined, gastric or colon malignancy, myeloma, bone marrow dysfunction,

AVMs, celiac disease and IBD are in the differential; therefore, I recommend

awaiting the heme parameters, transfusional support, advance his diet, heme

consult and either bone marrow workup and/or possible endoscopy to further

assess the symptoms either as an inpatient or outpatient pending the patient's

clinical course.

 



______________________________

ROHINI DENTON MD



DR:  SSP/radames  JOB#:  [id number] / [id number]

DD:  09/01/2018 12:21  DT:  09/02/2018 02:47



Dr. Rich Barr Dr.

## 2018-09-02 NOTE — PDOC2
CONSULT


Date of Consult


Date of Consult


DATE: 9/2/18 


TIME: 06:36





Reason for Consult


Reason for Consult:


abnormal CT of leg





Identification/Chief Complaint


Chief Complaint


post-op problems from L hip revision





Source


Source:  Patient





History of Present Illness


Reason for Visit:


Mr. Ho Chappell was having progressively worsening left hip pain for the past 

year. He previously underwent a left total hip arthroplasty with metal on metal 

joint on 1/12/2004 by Dr. Ravi Chung at [facility]. Imaging c/

w pseudotumor, pre-op labs showed high cobalt/chromium, and he was noted to 

have anemia with Hgb 10.6 (iron sat 29%, ferritin/ESR/CRP elevated), but no 

protein gap, hypercalcemia, or renal failure on pre-op labs. 


Dr Dove took him for L hip revision 7/10/18 - path c/w fibrosis, synovial 

proliferation, black granular pigment and chronic inflammation; less than five 

neutrophils per high-power field. Cultures were negative. Estimated blood loss 

during the procedure was ~1L. 


He has had continued problems with bleeding with resolving hematoma since the 

surgery, requiring PRBC transfusions at couple different times since. He came 

in to outpt Heme/Onc clinic 8/31/18 with Hgb again ~6g/dL yesterday, and was 

seen by my partner Dr Humphrey. She has already sent off labs for myeloma on Fri 8 /31/18. Retic count was inappropriately low. He was having some mild shortness 

of breath and fatigue; aspirin has been stopped 1 week ago, 


He has had extremely dark black stools for weeks now, and his wife has really 

been getting on him for that. Finally took a couple days worth of iron pills 

recently. Takes acid reflux medicines at least a couple times a month. Never 

had an EGD, but has had c-scope for colon cancer screening. Stool occult 

positive in ER yesterday.





Past Medical History


Cardiovascular:  HTN


Pulmonary:  Bronchitis


GI:  No pertinent hx


Heme/Onc:  No pertinent hx


Hepatobiliary:  No pertinent hx


Musculoskeletal:  Osteoarthritis


Renal/:  No pertinent hx


Endocrine:  No pertinent hx


Dermatology:  No pertinent hx





Past Surgical History


Past Surgical History:  Total hip replacement (with revision July 2018)





Family History


Family History:  No Significant





Social History


No


ALCOHOL:  none


Drugs:  None





Current Problem List


Problem List


Problems


Medical Problems:


(1) Anemia


Status: Acute  





(2) GI bleed


Status: Acute  





(3) Urinary tract infection


Status: Acute  











Current Medications


Current Medications





Current Medications


Acetaminophen (Tylenol) 1,000 mg 1X  ONCE PO  Last administered on 9/1/18at 10:

38;  Start 9/1/18 at 10:45;  Stop 9/1/18 at 10:46;  Status DC


Ondansetron HCl (Zofran) 4 mg PRN Q8HRS  PRN IV NAUSEA/VOMITING;  Start 9/1/18 

at 11:15;  Stop 9/1/18 at 12:04;  Status DC


Acetaminophen (Tylenol) 650 mg PRN Q4HRS  PRN PO FEVER Last administered on 9/1/ 18at 21:27;  Start 9/1/18 at 11:15;  Stop 9/2/18 at 11:14


Levofloxacin (Levaquin) 500 mg DAILY06 PO ;  Start 9/1/18 at 12:00;  Stop 9/1/ 18 at 12:04;  Status DC


Lactobacillus Rhamnosus (Culturelle) 1 cap BID PO  Last administered on 9/1/ 18at 21:24;  Start 9/1/18 at 21:00


Ondansetron HCl (Zofran) 4 mg PRN Q6HRS  PRN IV NAUSEA/VOMITING 1ST CHOICE;  

Start 9/1/18 at 12:15


Levofloxacin/ Dextrose (Levaquin Per Pharmacy) 1 each PRN DAILY  PRN [facility] SEE 

COMMENTS;  Start 9/1/18 at 12:15


Morphine Sulfate (Morphine Sulfate) 2 mg PRN Q2HR  PRN IV PAIN SEVERE;  Start 9/ 1/18 at 12:15


Acetaminophen/ Hydrocodone Bitart (Lortab 5/325) 1 tab PRN Q4HRS  PRN PO PAIN;  

Start 9/1/18 at 12:15


Diphenhydramine HCl (Benadryl) 25 mg PRN QHS  PRN PO INSOMNIA;  Start 9/1/18 at 

12:15


Levofloxacin (Levaquin) 250 mg DAILY06 PO  Last administered on 9/2/18at 06:20;

  Start 9/1/18 at 13:00


Pantoprazole Sodium (Protonix) 40 mg DAILYAC PO ;  Start 9/2/18 at 07:30


Pantoprazole Sodium (Protonix) 40 mg 1X  ONCE PO  Last administered on 9/1/18at 

13:17;  Start 9/1/18 at 12:45;  Stop 9/1/18 at 12:46;  Status DC





Allergies


Allergies:  


Coded Allergies:  


     No Known Drug Allergies (Unverified , 9/1/18)





ROS


General:  YES: Fatigue


PSYCHOLOGICAL ROS:  No: Anxiety, Behavioral Disorder, Concentration difficultie

, Decreased libido, Depression, Disorientation, Hallucinations, Hostility, 

Irritablity, Memory difficulties, Mood Swings, Obsessive thoughts, Physical 

abuse, Sexual abuse, Sleep disturbances, Suicidal ideation, Other


Eyes:  No Blurry vision, No Decreased vision, No Double vision, No Dry eyes, No 

Excessive tearing, No Eye Pain, No Itchy Eyes, No Loss of vision, No Photophobia

, No Scotomata, No Uses contacts, No Uses glasses, No Other


HEENT:  No: Heacaches, Visual Changes, Hearing change, Nasal congestion, Nasal 

discharge, Oral lesions, Sinus pain, Sore Throat, Epistaxis, Sneezing, Snoring, 

Tinnitus, Vertigo, Vocal changes, Other


Hematological and Lymphatic:  YES: Bleeding Problems, Blood Transfusions, 

Brusing


ENDOCRINE:  No: Breast Changes, Galactorrhea, Hair Pattern Changes, Hot Flashes

, Malaise/lethargy, Mood Swings, Palpitations, Polydipsia/polyuria, Skin Changes

, Temperature Intolerance, Unexpected Weight Changes, Other


Respiratory:  YES: SOB with excertion


Cardiovascular:  No Chest Pain, No Palpitations, No Orthopnea, No Paroxysmal 

Noc. Dyspnea, No Edema, No Lt Headedness, No Other


Gastrointestinal:  No Nausea, No Vomiting, No Abdominal Pain, No Diarrhea, No 

Constipation, No Melena, No Hematochezia, No Other


Genitourinary:  No Dysuria, No Frequency, No Incontinence, No Hematuria, No 

Retention, No Discharge, No Urgency, No Pain, No Flank Pain, No Other, No , No 

, No , No , No , No , No 


Musculoskeletal:  Yes Joint Pain


Neurological:  No Behavorial Changes, No Bowel/Bladder ControlChng, No Confusion

, No Dizziness, No Gait Disturbance, No Headaches, No Impaired Coord/balance, 

No Memory Loss, No Numbness/Tingling, No Seizures, No Speech Problems, No 

Tremors, No Visual Changes, No Weakness, No Other





Physical Exam


General:  Alert, Oriented X3, Cooperative, No acute distress


HEENT:  Atraumatic, EOMI


Lungs:  Clear to auscultation, Normal air movement


Heart:  Regular rate, No murmurs


Abdomen:  Normal bowel sounds, No tenderness


Extremities:  No clubbing, No cyanosis, No edema


Skin:  No rashes, No significant lesion


Neuro:  Normal gait, Normal speech, Strength at 5/5 X4 ext, Normal tone


Psych/Mental Status:  Mental status NL


MUSCULOSKELETAL:  Abnormal exam of left (hip - minimal swelling, good movement, 

)





Vitals


VITALS





Vital Signs








  Date Time  Temp Pulse Resp B/P (MAP) Pulse Ox O2 Delivery O2 Flow Rate FiO2


 


9/2/18 03:50 98.7 80 18 128/75 (92) 96 Room Air  





 98.7       











Labs


Labs





Laboratory Tests








Test


 9/1/18


09:36 9/1/18


10:27 9/1/18


10:41 9/1/18


18:45


 


White Blood Count


 9.8 x10^3/uL


(4.0-11.0) 


 


 9.6 x10^3/uL


(4.0-11.0)


 


Red Blood Count


 1.89 x10^6/uL


(4.30-5.70) 


 


 2.49 x10^6/uL


(4.30-5.70)


 


Hemoglobin


 5.8 g/dL


(13.0-17.5) 


 


 7.7 g/dL


(13.0-17.5)


 


Hematocrit


 16.4 %


(39.0-53.0) 


 


 21.6 %


(39.0-53.0)


 


Mean Corpuscular Volume 87 fL ()    87 fL () 


 


Mean Corpuscular Hemoglobin 31 pg (25-35)    31 pg (25-35) 


 


Mean Corpuscular Hemoglobin


Concent 36 g/dL


(31-37) 


 


 36 g/dL


(31-37)


 


Red Cell Distribution Width


 14.3 %


(11.5-14.5) 


 


 14.5 %


(11.5-14.5)


 


Platelet Count


 372 x10^3/uL


(140-400) 


 


 360 x10^3/uL


(140-400)


 


Neutrophils (%) (Auto) 70 % (31-73)    


 


Lymphocytes (%) (Auto) 13 % (24-48)    


 


Monocytes (%) (Auto) 17 % (0-9)    


 


Eosinophils (%) (Auto) 0 % (0-3)    


 


Basophils (%) (Auto) 1 % (0-3)    


 


Neutrophils # (Auto)


 6.9 x10^3uL


(1.8-7.7) 


 


 





 


Lymphocytes # (Auto)


 1.2 x10^3/uL


(1.0-4.8) 


 


 





 


Monocytes # (Auto)


 1.6 x10^3/uL


(0.0-1.1) 


 


 





 


Eosinophils # (Auto)


 0.0 x10^3/uL


(0.0-0.7) 


 


 





 


Basophils # (Auto)


 0.1 x10^3/uL


(0.0-0.2) 


 


 





 


Prothrombin Time


 14.9 SEC


(11.7-14.0) 


 


 





 


Prothromb Time International


Ratio 1.2 (0.8-1.1) 


 


 


 





 


Activated Partial


Thromboplast Time 34 SEC (24-38) 


 


 


 





 


Sodium Level


 136 mmol/L


(136-145) 


 


 





 


Potassium Level


 3.9 mmol/L


(3.5-5.1) 


 


 





 


Chloride Level


 102 mmol/L


() 


 


 





 


Carbon Dioxide Level


 25 mmol/L


(21-32) 


 


 





 


Anion Gap 9 (6-14)    


 


Blood Urea Nitrogen


 10 mg/dL


(8-26) 


 


 





 


Creatinine


 1.2 mg/dL


(0.7-1.3) 


 


 





 


Estimated GFR


(Cockcroft-Gault) 74.2 


 


 


 





 


Glucose Level


 112 mg/dL


(70-99) 


 


 





 


Calcium Level


 8.3 mg/dL


(8.5-10.1) 


 


 





 


Iron Level


 53 ug/dL


() 


 


 





 


Total Iron Binding Capacity


 137 ug/dL


(250-450) 


 


 





 


Iron Saturation 39 % (15-34)    


 


Total Bilirubin


 1.0 mg/dL


(0.2-1.0) 


 


 





 


Direct Bilirubin


 0.3 mg/dL


(0.0-0.2) 


 


 





 


Aspartate Amino Transf


(AST/SGOT) 22 U/L (15-37) 


 


 


 





 


Alanine Aminotransferase


(ALT/SGPT) 27 U/L (16-63) 


 


 


 





 


Alkaline Phosphatase


 324 U/L


() 


 


 





 


Total Protein


 6.9 g/dL


(6.4-8.2) 


 


 





 


Albumin


 2.9 g/dL


(3.4-5.0) 


 


 





 


Urine Collection Type  Void   


 


Urine Color  Yellow   


 


Urine Clarity  Clear   


 


Urine pH  6.5   


 


Urine Specific Gravity  1.010   


 


Urine Protein


 


 Negative mg/dL


(NEG-TRACE) 


 





 


Urine Glucose (UA)


 


 Negative mg/dL


(NEG) 


 





 


Urine Ketones (Stick)


 


 Negative mg/dL


(NEG) 


 





 


Urine Blood  Negative (NEG)   


 


Urine Nitrite  Positive (NEG)   


 


Urine Bilirubin  Negative (NEG)   


 


Urine Urobilinogen Dipstick


 


 4.0 mg/dL (0.2


mg/dL) 


 





 


Urine Leukocyte Esterase  Moderate (NEG)   


 


Urine RBC  Occ /HPF (0-2)   


 


Urine WBC


 


 20-40 /HPF


(0-4) 


 





 


Urine Squamous Epithelial


Cells 


 Few /LPF 


 


 





 


Urine Bacteria


 


 Many /HPF


(0-FEW) 


 





 


Stool Occult Blood   Positive (NEG)  


 


Test


 9/2/18


03:00 


 


 





 


White Blood Count


 8.6 x10^3/uL


(4.0-11.0) 


 


 





 


Red Blood Count


 2.73 x10^6/uL


(4.30-5.70) 


 


 





 


Hemoglobin


 8.5 g/dL


(13.0-17.5) 


 


 





 


Hematocrit


 23.5 %


(39.0-53.0) 


 


 





 


Mean Corpuscular Volume 86 fL ()    


 


Mean Corpuscular Hemoglobin 31 pg (25-35)    


 


Mean Corpuscular Hemoglobin


Concent 36 g/dL


(31-37) 


 


 





 


Red Cell Distribution Width


 14.7 %


(11.5-14.5) 


 


 





 


Platelet Count


 323 x10^3/uL


(140-400) 


 


 





 


Neutrophils (%) (Auto) 59 % (31-73)    


 


Lymphocytes (%) (Auto) 24 % (24-48)    


 


Monocytes (%) (Auto) 16 % (0-9)    


 


Eosinophils (%) (Auto) 2 % (0-3)    


 


Basophils (%) (Auto) 0 % (0-3)    


 


Neutrophils # (Auto)


 5.1 x10^3uL


(1.8-7.7) 


 


 





 


Lymphocytes # (Auto)


 2.1 x10^3/uL


(1.0-4.8) 


 


 





 


Monocytes # (Auto)


 1.3 x10^3/uL


(0.0-1.1) 


 


 





 


Eosinophils # (Auto)


 0.1 x10^3/uL


(0.0-0.7) 


 


 





 


Basophils # (Auto)


 0.0 x10^3/uL


(0.0-0.2) 


 


 





 


Erythrocyte Sedimentation Rate 55 (0-15)    


 


Reticulocyte Count (auto)


 0.4 %


(0.5-2.5) 


 


 





 


Sodium Level


 138 mmol/L


(136-145) 


 


 





 


Potassium Level


 4.1 mmol/L


(3.5-5.1) 


 


 





 


Chloride Level


 106 mmol/L


() 


 


 





 


Carbon Dioxide Level


 24 mmol/L


(21-32) 


 


 





 


Anion Gap 8 (6-14)    


 


Blood Urea Nitrogen


 12 mg/dL


(8-26) 


 


 





 


Creatinine


 1.1 mg/dL


(0.7-1.3) 


 


 





 


Estimated GFR


(Cockcroft-Gault) 82.1 


 


 


 





 


BUN/Creatinine Ratio 11 (6-20)    


 


Glucose Level


 106 mg/dL


(70-99) 


 


 





 


Calcium Level


 8.1 mg/dL


(8.5-10.1) 


 


 





 


Total Bilirubin


 0.7 mg/dL


(0.2-1.0) 


 


 





 


Aspartate Amino Transf


(AST/SGOT) 22 U/L (15-37) 


 


 


 





 


Alanine Aminotransferase


(ALT/SGPT) 26 U/L (16-63) 


 


 


 





 


Alkaline Phosphatase


 295 U/L


() 


 


 





 


C-Reactive Protein,


Quantitative 104.2 mg/L


(0-3.3) 


 


 





 


Total Protein


 6.4 g/dL


(6.4-8.2) 


 


 





 


Albumin


 2.6 g/dL


(3.4-5.0) 


 


 





 


Albumin/Globulin Ratio 0.7 (1.0-1.7)    








Laboratory Tests








Test


 9/1/18


09:36 9/1/18


10:27 9/1/18


10:41 9/1/18


18:45


 


White Blood Count


 9.8 x10^3/uL


(4.0-11.0) 


 


 9.6 x10^3/uL


(4.0-11.0)


 


Red Blood Count


 1.89 x10^6/uL


(4.30-5.70) 


 


 2.49 x10^6/uL


(4.30-5.70)


 


Hemoglobin


 5.8 g/dL


(13.0-17.5) 


 


 7.7 g/dL


(13.0-17.5)


 


Hematocrit


 16.4 %


(39.0-53.0) 


 


 21.6 %


(39.0-53.0)


 


Mean Corpuscular Volume 87 fL ()    87 fL () 


 


Mean Corpuscular Hemoglobin 31 pg (25-35)    31 pg (25-35) 


 


Mean Corpuscular Hemoglobin


Concent 36 g/dL


(31-37) 


 


 36 g/dL


(31-37)


 


Red Cell Distribution Width


 14.3 %


(11.5-14.5) 


 


 14.5 %


(11.5-14.5)


 


Platelet Count


 372 x10^3/uL


(140-400) 


 


 360 x10^3/uL


(140-400)


 


Neutrophils (%) (Auto) 70 % (31-73)    


 


Lymphocytes (%) (Auto) 13 % (24-48)    


 


Monocytes (%) (Auto) 17 % (0-9)    


 


Eosinophils (%) (Auto) 0 % (0-3)    


 


Basophils (%) (Auto) 1 % (0-3)    


 


Neutrophils # (Auto)


 6.9 x10^3uL


(1.8-7.7) 


 


 





 


Lymphocytes # (Auto)


 1.2 x10^3/uL


(1.0-4.8) 


 


 





 


Monocytes # (Auto)


 1.6 x10^3/uL


(0.0-1.1) 


 


 





 


Eosinophils # (Auto)


 0.0 x10^3/uL


(0.0-0.7) 


 


 





 


Basophils # (Auto)


 0.1 x10^3/uL


(0.0-0.2) 


 


 





 


Prothrombin Time


 14.9 SEC


(11.7-14.0) 


 


 





 


Prothromb Time International


Ratio 1.2 (0.8-1.1) 


 


 


 





 


Activated Partial


Thromboplast Time 34 SEC (24-38) 


 


 


 





 


Sodium Level


 136 mmol/L


(136-145) 


 


 





 


Potassium Level


 3.9 mmol/L


(3.5-5.1) 


 


 





 


Chloride Level


 102 mmol/L


() 


 


 





 


Carbon Dioxide Level


 25 mmol/L


(21-32) 


 


 





 


Anion Gap 9 (6-14)    


 


Blood Urea Nitrogen


 10 mg/dL


(8-26) 


 


 





 


Creatinine


 1.2 mg/dL


(0.7-1.3) 


 


 





 


Estimated GFR


(Cockcroft-Gault) 74.2 


 


 


 





 


Glucose Level


 112 mg/dL


(70-99) 


 


 





 


Calcium Level


 8.3 mg/dL


(8.5-10.1) 


 


 





 


Iron Level


 53 ug/dL


() 


 


 





 


Total Iron Binding Capacity


 137 ug/dL


(250-450) 


 


 





 


Iron Saturation 39 % (15-34)    


 


Total Bilirubin


 1.0 mg/dL


(0.2-1.0) 


 


 





 


Direct Bilirubin


 0.3 mg/dL


(0.0-0.2) 


 


 





 


Aspartate Amino Transf


(AST/SGOT) 22 U/L (15-37) 


 


 


 





 


Alanine Aminotransferase


(ALT/SGPT) 27 U/L (16-63) 


 


 


 





 


Alkaline Phosphatase


 324 U/L


() 


 


 





 


Total Protein


 6.9 g/dL


(6.4-8.2) 


 


 





 


Albumin


 2.9 g/dL


(3.4-5.0) 


 


 





 


Urine Collection Type  Void   


 


Urine Color  Yellow   


 


Urine Clarity  Clear   


 


Urine pH  6.5   


 


Urine Specific Gravity  1.010   


 


Urine Protein


 


 Negative mg/dL


(NEG-TRACE) 


 





 


Urine Glucose (UA)


 


 Negative mg/dL


(NEG) 


 





 


Urine Ketones (Stick)


 


 Negative mg/dL


(NEG) 


 





 


Urine Blood  Negative (NEG)   


 


Urine Nitrite  Positive (NEG)   


 


Urine Bilirubin  Negative (NEG)   


 


Urine Urobilinogen Dipstick


 


 4.0 mg/dL (0.2


mg/dL) 


 





 


Urine Leukocyte Esterase  Moderate (NEG)   


 


Urine RBC  Occ /HPF (0-2)   


 


Urine WBC


 


 20-40 /HPF


(0-4) 


 





 


Urine Squamous Epithelial


Cells 


 Few /LPF 


 


 





 


Urine Bacteria


 


 Many /HPF


(0-FEW) 


 





 


Stool Occult Blood   Positive (NEG)  


 


Test


 9/2/18


03:00 


 


 





 


White Blood Count


 8.6 x10^3/uL


(4.0-11.0) 


 


 





 


Red Blood Count


 2.73 x10^6/uL


(4.30-5.70) 


 


 





 


Hemoglobin


 8.5 g/dL


(13.0-17.5) 


 


 





 


Hematocrit


 23.5 %


(39.0-53.0) 


 


 





 


Mean Corpuscular Volume 86 fL ()    


 


Mean Corpuscular Hemoglobin 31 pg (25-35)    


 


Mean Corpuscular Hemoglobin


Concent 36 g/dL


(31-37) 


 


 





 


Red Cell Distribution Width


 14.7 %


(11.5-14.5) 


 


 





 


Platelet Count


 323 x10^3/uL


(140-400) 


 


 





 


Neutrophils (%) (Auto) 59 % (31-73)    


 


Lymphocytes (%) (Auto) 24 % (24-48)    


 


Monocytes (%) (Auto) 16 % (0-9)    


 


Eosinophils (%) (Auto) 2 % (0-3)    


 


Basophils (%) (Auto) 0 % (0-3)    


 


Neutrophils # (Auto)


 5.1 x10^3uL


(1.8-7.7) 


 


 





 


Lymphocytes # (Auto)


 2.1 x10^3/uL


(1.0-4.8) 


 


 





 


Monocytes # (Auto)


 1.3 x10^3/uL


(0.0-1.1) 


 


 





 


Eosinophils # (Auto)


 0.1 x10^3/uL


(0.0-0.7) 


 


 





 


Basophils # (Auto)


 0.0 x10^3/uL


(0.0-0.2) 


 


 





 


Erythrocyte Sedimentation Rate 55 (0-15)    


 


Reticulocyte Count (auto)


 0.4 %


(0.5-2.5) 


 


 





 


Sodium Level


 138 mmol/L


(136-145) 


 


 





 


Potassium Level


 4.1 mmol/L


(3.5-5.1) 


 


 





 


Chloride Level


 106 mmol/L


() 


 


 





 


Carbon Dioxide Level


 24 mmol/L


(21-32) 


 


 





 


Anion Gap 8 (6-14)    


 


Blood Urea Nitrogen


 12 mg/dL


(8-26) 


 


 





 


Creatinine


 1.1 mg/dL


(0.7-1.3) 


 


 





 


Estimated GFR


(Cockcroft-Gault) 82.1 


 


 


 





 


BUN/Creatinine Ratio 11 (6-20)    


 


Glucose Level


 106 mg/dL


(70-99) 


 


 





 


Calcium Level


 8.1 mg/dL


(8.5-10.1) 


 


 





 


Total Bilirubin


 0.7 mg/dL


(0.2-1.0) 


 


 





 


Aspartate Amino Transf


(AST/SGOT) 22 U/L (15-37) 


 


 


 





 


Alanine Aminotransferase


(ALT/SGPT) 26 U/L (16-63) 


 


 


 





 


Alkaline Phosphatase


 295 U/L


() 


 


 





 


C-Reactive Protein,


Quantitative 104.2 mg/L


(0-3.3) 


 


 





 


Total Protein


 6.4 g/dL


(6.4-8.2) 


 


 





 


Albumin


 2.6 g/dL


(3.4-5.0) 


 


 





 


Albumin/Globulin Ratio 0.7 (1.0-1.7)    











Images


Images


MRI L hip June 2018 pre-op: 


Magnetic stability artifacts are identified involving the left hip consistent 

with the patient's left hip arthroplasty. A large complex fluid collection is 

identified surrounding the hip arthroplasty extending posteriorly and 

laterally. A component of the fluid collection extends anteriorly just deep to 

the tensor fascia chalino. This demonstrates decreased signal intensity on STIR 

images. The gluteus minimus tendon is intact. Demonstrates abnormal signal 

adjacent to insertion on the greater trochanter the proximal left. Felt to 

represent advanced tendinopathy. There is no evidence of complete disruption of 

this tendon. The hamstring tendons are intact. The origin of the adductor 

muscles appears unremarkable. Coronal STIR survey images of the pelvis 

demonstrate loss of articular cartilage involving right hip. Subarticular cysts 

are present involving the right hip. A right hip joint effusion is present. A 

tear of the lateral aspect of the labrum of the hip is demonstrated on the 

coronal images. 





CT L hip 8/10/18 post-op at [facility]: 


Postsurgical changes of a left total hip arthroplasty is identified with 

multiple cerclage wires along the proximal femur metadiaphysis with a subacute 

likely oriented fracture of the proximal femoral metadiaphysis which 

demonstrates early callus formation, compatible with interval healing. Hardware 

remains intact. A predominantly low-density fluid collection extends from the 

left gluteus musculature muscles along the posterior and posterior lateral 

proximal femur to the level of the distal femoral stem, the overall dimensions 

of the fluid collection measures 26 cm craniocaudal and 9.1 transverse x 3.6 cm 

AP. No layering or high density components are identified. No additional 

discrete intramuscular collections are identified. Multiple foci of myositis 

ossificans are noted within the peripheral aspect of the vastus lateralis, deep 

to the tensor fascia chalino. Subcutaneous edema and surgical excision is again 

identified extending from the posterior soft tissues to the soft tissues of the 

anterolateral left thigh. 





Hip/pelvis x-rays 8/24/18: 


Redemonstration of elevation of left total hip arthroplasty with multiple 

cerclage wires about the proximal femur, components intact. Unchanged alignment 

of comminuted proximal left femoral fracture with further maturation of bony 

callus formation consistent with partial progressive incomplete healing. 

Slightly increased heterotopic ossification inferior medial to the left femoral 

neck. Unchanged moderate right hip arthrosis. No acute osseous abnormality. 

Bilateral SI and pubic symphysis joint spaces are maintained with normal 

alignment. Unchanged appearance of lower lumbar degenerative disease with disc 

spacers at L4-5 and L5-S1.





CT STUDY OF THE LEFT HIP AND LEFT FEMUR WITHOUT CONTRAST 9/1/18 done here


 COMPARISON: None available.


 FINDINGS: A left hip arthroplasty is seen which is well aligned. There is 

metallic streaking artifact related to the arthroplasty. There is a 

nondisplaced comminuted spiral fracture of the proximal shaft of the left 

femur. A long femoral stem prosthesis is seen extending through the proximal 

left femoral shaft and intertrochanteric area down to the distal shaft level 

below the level of the fracture. Bone infarct is seen within the distal shaft 

of the left femur distal to the femoral stem prosthesis. 4 cerclage wires are 

evident. Fracture fragments are seen anteriorly and medially. Some mild callus 

formation is seen around the upper portion of the fracture. Heterotopic soft 

tissue ossification is seen. There is a radiolucent lesion of the roof of the 

acetabulum anteriorly with associated soft tissue component.  Differential 

considerations include metastatic disease or myeloma or infection or 


loosening. This lesion measures 4.1 cm transversely and 2.4 cm in AP dimension 

and 1.9 cm in vertical dimension including the soft tissue component.. There is 

myositis ossificans laterally within the proximal left thigh which measures 

10.8 cm in vertical dimension. There is a fluid collection measuring 8 cm in 

length just lateral to the greater trochanter. This is consistent with greater 

trochanteric bursitis or postoperative hematoma or seroma. There is lateral 

subcutaneous soft tissue edema which may be related to the recent surgery. 

There is an ill-defined hypodensity present within the biceps femoris muscle. 

This could be related to the streaking artifact from the prosthesis or could be 

due to an intramuscular hematoma related to muscle injury. This is seen at the 

mid thigh level.





Assessment/Plan


Assessment/Plan


63-year-old male with acute on chronic anemia s/p L hip revision July 2018 with 

resolving hematoma L hip. Rec'd 3 U PRBCs 9/1/18 with appropriate response from 

mid5g/dL to mid8g/dL. Vitals are stable and he ate well last night. With 

regards to the abnormality on the CT of his leg, with the revision that he had 

done in July, I am not as worried about this area. This is unlikely myeloma, as 

there is no problems with calcium, renal failure or proteinuria. An SPEP was 

sent on Friday though. He will be following with Dr Dove routinely.





For anemia, no history of bleeding problems. He has had multiple PRBC 

transfusions of late. The fact that his ESR/CRP/plts were elevated both pre-op 

and now suggest ferritin is likely elevated as an acute phase reactant too. 

There is likely a component of anemia of chronic disease related to his high 

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 

melenic stools and hx of acid reflux are concerns for slow upper GI bleed.


 


I think it could be a component of a couple things going on - 


1.) blood loss - repeat operation is not out of the question if he continues to 

bleed there; he had some underlying anemia pre-op, most likely chronic disease, 

but with stool occult positivity, should be worked up by GI (which could be 

done as outpt as he is clinically stable)


2.) bone marrow is not responding like we want, and a bone marrow could be 

considered as well (again, could be done as outpt). He already has follow-up 

planned with Dr Humphrey for Tuesday.





The fact that he is stable after the PRBC transfusions yesterday suggests he 

could be discharged from my end. He will be having close follow-up as an outpt 

with Dr Humphrey and Dr Dove's teams through . I would also want GI to scope 

him here sooner rather than later, which he agrees with.





Remain off aspirin for now.


Thanks for the consult. 


Mirta, cell [phone]











YUNIOR QUINTANA MD Sep 2, 2018 07:12

## 2018-09-02 NOTE — PDOC2
CONSULT


Date of Consult


Date of Consult


DATE: 9/2/18 


TIME: 12:59





Reason for Consult


Reason for Consult:


left hip hematoma





Identification/Chief Complaint


Chief Complaint


recent left hip surgery, no pain





Source


Source:  Chart review, Patient





History of Present Illness


Reason for Visit:


The patient is a 63 year old male admitted with anemia. He had left metal-on-

metal total hip arthroplasty in 2004 by Dr. Chung and had revision left total 

hip arthroplasty on 7/10/18 at  by Dr. Dove. He states he has no hip pain 

and has done well since surgery as far as the hip goes. He has had multiple 

incidences of anemia since surgery requiring transfusion. His hemoglobin is up 

to 8.5 today after transfusion of 3 units PRBCs. .2 and ESR 55, which 

were reportedly elevated before surgery as well.





Past Medical History


Cardiovascular:  HTN


Pulmonary:  Bronchitis


GI:  No pertinent hx


Heme/Onc:  No pertinent hx


Hepatobiliary:  No pertinent hx


Musculoskeletal:  Osteoarthritis


Renal/:  No pertinent hx


Endocrine:  No pertinent hx


Dermatology:  No pertinent hx





Past Surgical History


Past Surgical History:  Total hip replacement (primary in 2004 with revision 7/

10/18)





Family History


Family History:  No Significant





Social History


No


ALCOHOL:  none


Drugs:  None


Lives:  with Family





Current Problem List


Problem List


Problems


Medical Problems:


(1) Anemia


Status: Acute  





(2) GI bleed


Status: Acute  





(3) Urinary tract infection


Status: Acute  











Current Medications


Current Medications





Current Medications


Acetaminophen (Tylenol) 1,000 mg 1X  ONCE PO  Last administered on 9/1/18at 10:

38;  Start 9/1/18 at 10:45;  Stop 9/1/18 at 10:46;  Status DC


Ondansetron HCl (Zofran) 4 mg PRN Q8HRS  PRN IV NAUSEA/VOMITING;  Start 9/1/18 

at 11:15;  Stop 9/1/18 at 12:04;  Status DC


Acetaminophen (Tylenol) 650 mg PRN Q4HRS  PRN PO FEVER Last administered on 9/1/ 18at 21:27;  Start 9/1/18 at 11:15;  Stop 9/2/18 at 11:14;  Status DC


Levofloxacin (Levaquin) 500 mg DAILY06 PO ;  Start 9/1/18 at 12:00;  Stop 9/1/ 18 at 12:04;  Status DC


Lactobacillus Rhamnosus (Culturelle) 1 cap BID PO  Last administered on 9/2/ 18at 11:28;  Start 9/1/18 at 21:00


Ondansetron HCl (Zofran) 4 mg PRN Q6HRS  PRN IV NAUSEA/VOMITING 1ST CHOICE;  

Start 9/1/18 at 12:15


Levofloxacin/ Dextrose (Levaquin Per Pharmacy) 1 each PRN DAILY  PRN [facility] SEE 

COMMENTS;  Start 9/1/18 at 12:15


Morphine Sulfate (Morphine Sulfate) 2 mg PRN Q2HR  PRN IV PAIN SEVERE;  Start 9/ 1/18 at 12:15


Acetaminophen/ Hydrocodone Bitart (Lortab 5/325) 1 tab PRN Q4HRS  PRN PO PAIN;  

Start 9/1/18 at 12:15


Diphenhydramine HCl (Benadryl) 25 mg PRN QHS  PRN PO INSOMNIA;  Start 9/1/18 at 

12:15


Levofloxacin (Levaquin) 250 mg DAILY06 PO  Last administered on 9/2/18at 06:20;

  Start 9/1/18 at 13:00


Pantoprazole Sodium (Protonix) 40 mg DAILYAC PO  Last administered on 9/2/18at 

11:28;  Start 9/2/18 at 07:30


Pantoprazole Sodium (Protonix) 40 mg 1X  ONCE PO  Last administered on 9/1/18at 

13:17;  Start 9/1/18 at 12:45;  Stop 9/1/18 at 12:46;  Status DC





Allergies


Allergies:  


Coded Allergies:  


     No Known Drug Allergies (Unverified , 9/1/18)





Physical Exam


General:  Alert, Oriented X3, Cooperative, No acute distress


HEENT:  Atraumatic, EOMI


Lungs:  Normal air movement


Heart:  Regular rate


Abdomen:  Soft


Extremities:  No clubbing, No cyanosis, Normal pulses


Skin:  No rashes, No breakdown, No significant lesion


Neuro:  Normal speech, Sensation intact


Psych/Mental Status:  Mental status NL, Mood NL


MUSCULOSKELETAL:  Other (The left hip incision is clean, dry, and intact. Well-

healed with no evidence of infection. No surrounding erythema or warmth. 

Minimal swelling. No tenderness to palpation about the hip. Full range of 

motion of left hip, without pain. Strength intact. Thigh and calf soft and 

nontender with negative Makayla's sign. Good dorsiflexion and plantarflexion at 

foot, with no evidence of neurovascular injury. Peripheral pulses and light 

touch sensation intact. )





Vitals


VITALS





Vital Signs








  Date Time  Temp Pulse Resp B/P (MAP) Pulse Ox O2 Delivery O2 Flow Rate FiO2


 


9/2/18 10:47 97.9 81 18 130/75 (93) 98 Room Air  





 97.9       











Labs


Labs





Laboratory Tests








Test


 9/1/18


09:36 9/1/18


10:27 9/1/18


10:41 9/1/18


18:45


 


White Blood Count


 9.8 x10^3/uL


(4.0-11.0) 


 


 9.6 x10^3/uL


(4.0-11.0)


 


Red Blood Count


 1.89 x10^6/uL


(4.30-5.70) 


 


 2.49 x10^6/uL


(4.30-5.70)


 


Hemoglobin


 5.8 g/dL


(13.0-17.5) 


 


 7.7 g/dL


(13.0-17.5)


 


Hematocrit


 16.4 %


(39.0-53.0) 


 


 21.6 %


(39.0-53.0)


 


Mean Corpuscular Volume 87 fL ()    87 fL () 


 


Mean Corpuscular Hemoglobin 31 pg (25-35)    31 pg (25-35) 


 


Mean Corpuscular Hemoglobin


Concent 36 g/dL


(31-37) 


 


 36 g/dL


(31-37)


 


Red Cell Distribution Width


 14.3 %


(11.5-14.5) 


 


 14.5 %


(11.5-14.5)


 


Platelet Count


 372 x10^3/uL


(140-400) 


 


 360 x10^3/uL


(140-400)


 


Neutrophils (%) (Auto) 70 % (31-73)    


 


Lymphocytes (%) (Auto) 13 % (24-48)    


 


Monocytes (%) (Auto) 17 % (0-9)    


 


Eosinophils (%) (Auto) 0 % (0-3)    


 


Basophils (%) (Auto) 1 % (0-3)    


 


Neutrophils # (Auto)


 6.9 x10^3uL


(1.8-7.7) 


 


 





 


Lymphocytes # (Auto)


 1.2 x10^3/uL


(1.0-4.8) 


 


 





 


Monocytes # (Auto)


 1.6 x10^3/uL


(0.0-1.1) 


 


 





 


Eosinophils # (Auto)


 0.0 x10^3/uL


(0.0-0.7) 


 


 





 


Basophils # (Auto)


 0.1 x10^3/uL


(0.0-0.2) 


 


 





 


Prothrombin Time


 14.9 SEC


(11.7-14.0) 


 


 





 


Prothromb Time International


Ratio 1.2 (0.8-1.1) 


 


 


 





 


Activated Partial


Thromboplast Time 34 SEC (24-38) 


 


 


 





 


Sodium Level


 136 mmol/L


(136-145) 


 


 





 


Potassium Level


 3.9 mmol/L


(3.5-5.1) 


 


 





 


Chloride Level


 102 mmol/L


() 


 


 





 


Carbon Dioxide Level


 25 mmol/L


(21-32) 


 


 





 


Anion Gap 9 (6-14)    


 


Blood Urea Nitrogen


 10 mg/dL


(8-26) 


 


 





 


Creatinine


 1.2 mg/dL


(0.7-1.3) 


 


 





 


Estimated GFR


(Cockcroft-Gault) 74.2 


 


 


 





 


Glucose Level


 112 mg/dL


(70-99) 


 


 





 


Calcium Level


 8.3 mg/dL


(8.5-10.1) 


 


 





 


Iron Level


 53 ug/dL


() 


 


 





 


Total Iron Binding Capacity


 137 ug/dL


(250-450) 


 


 





 


Iron Saturation 39 % (15-34)    


 


Total Bilirubin


 1.0 mg/dL


(0.2-1.0) 


 


 





 


Direct Bilirubin


 0.3 mg/dL


(0.0-0.2) 


 


 





 


Aspartate Amino Transf


(AST/SGOT) 22 U/L (15-37) 


 


 


 





 


Alanine Aminotransferase


(ALT/SGPT) 27 U/L (16-63) 


 


 


 





 


Alkaline Phosphatase


 324 U/L


() 


 


 





 


Total Protein


 6.9 g/dL


(6.4-8.2) 


 


 





 


Albumin


 2.9 g/dL


(3.4-5.0) 


 


 





 


Urine Collection Type  Void   


 


Urine Color  Yellow   


 


Urine Clarity  Clear   


 


Urine pH  6.5   


 


Urine Specific Gravity  1.010   


 


Urine Protein


 


 Negative mg/dL


(NEG-TRACE) 


 





 


Urine Glucose (UA)


 


 Negative mg/dL


(NEG) 


 





 


Urine Ketones (Stick)


 


 Negative mg/dL


(NEG) 


 





 


Urine Blood  Negative (NEG)   


 


Urine Nitrite  Positive (NEG)   


 


Urine Bilirubin  Negative (NEG)   


 


Urine Urobilinogen Dipstick


 


 4.0 mg/dL (0.2


mg/dL) 


 





 


Urine Leukocyte Esterase  Moderate (NEG)   


 


Urine RBC  Occ /HPF (0-2)   


 


Urine WBC


 


 20-40 /HPF


(0-4) 


 





 


Urine Squamous Epithelial


Cells 


 Few /LPF 


 


 





 


Urine Bacteria


 


 Many /HPF


(0-FEW) 


 





 


Stool Occult Blood   Positive (NEG)  


 


Test


 9/2/18


03:00 


 


 





 


White Blood Count


 8.6 x10^3/uL


(4.0-11.0) 


 


 





 


Red Blood Count


 2.73 x10^6/uL


(4.30-5.70) 


 


 





 


Hemoglobin


 8.5 g/dL


(13.0-17.5) 


 


 





 


Hematocrit


 23.5 %


(39.0-53.0) 


 


 





 


Mean Corpuscular Volume 86 fL ()    


 


Mean Corpuscular Hemoglobin 31 pg (25-35)    


 


Mean Corpuscular Hemoglobin


Concent 36 g/dL


(31-37) 


 


 





 


Red Cell Distribution Width


 14.7 %


(11.5-14.5) 


 


 





 


Platelet Count


 323 x10^3/uL


(140-400) 


 


 





 


Neutrophils (%) (Auto) 59 % (31-73)    


 


Lymphocytes (%) (Auto) 24 % (24-48)    


 


Monocytes (%) (Auto) 16 % (0-9)    


 


Eosinophils (%) (Auto) 2 % (0-3)    


 


Basophils (%) (Auto) 0 % (0-3)    


 


Neutrophils # (Auto)


 5.1 x10^3uL


(1.8-7.7) 


 


 





 


Lymphocytes # (Auto)


 2.1 x10^3/uL


(1.0-4.8) 


 


 





 


Monocytes # (Auto)


 1.3 x10^3/uL


(0.0-1.1) 


 


 





 


Eosinophils # (Auto)


 0.1 x10^3/uL


(0.0-0.7) 


 


 





 


Basophils # (Auto)


 0.0 x10^3/uL


(0.0-0.2) 


 


 





 


Erythrocyte Sedimentation Rate 55 (0-15)    


 


Reticulocyte Count (auto)


 0.4 %


(0.5-2.5) 


 


 





 


Sodium Level


 138 mmol/L


(136-145) 


 


 





 


Potassium Level


 4.1 mmol/L


(3.5-5.1) 


 


 





 


Chloride Level


 106 mmol/L


() 


 


 





 


Carbon Dioxide Level


 24 mmol/L


(21-32) 


 


 





 


Anion Gap 8 (6-14)    


 


Blood Urea Nitrogen


 12 mg/dL


(8-26) 


 


 





 


Creatinine


 1.1 mg/dL


(0.7-1.3) 


 


 





 


Estimated GFR


(Cockcroft-Gault) 82.1 


 


 


 





 


BUN/Creatinine Ratio 11 (6-20)    


 


Glucose Level


 106 mg/dL


(70-99) 


 


 





 


Calcium Level


 8.1 mg/dL


(8.5-10.1) 


 


 





 


Total Bilirubin


 0.7 mg/dL


(0.2-1.0) 


 


 





 


Aspartate Amino Transf


(AST/SGOT) 22 U/L (15-37) 


 


 


 





 


Alanine Aminotransferase


(ALT/SGPT) 26 U/L (16-63) 


 


 


 





 


Alkaline Phosphatase


 295 U/L


() 


 


 





 


C-Reactive Protein,


Quantitative 104.2 mg/L


(0-3.3) 


 


 





 


Total Protein


 6.4 g/dL


(6.4-8.2) 


 


 





 


Albumin


 2.6 g/dL


(3.4-5.0) 


 


 





 


Albumin/Globulin Ratio 0.7 (1.0-1.7)    








Laboratory Tests








Test


 9/1/18


18:45 9/2/18


03:00


 


White Blood Count


 9.6 x10^3/uL


(4.0-11.0) 8.6 x10^3/uL


(4.0-11.0)


 


Red Blood Count


 2.49 x10^6/uL


(4.30-5.70) 2.73 x10^6/uL


(4.30-5.70)


 


Hemoglobin


 7.7 g/dL


(13.0-17.5) 8.5 g/dL


(13.0-17.5)


 


Hematocrit


 21.6 %


(39.0-53.0) 23.5 %


(39.0-53.0)


 


Mean Corpuscular Volume 87 fL ()  86 fL () 


 


Mean Corpuscular Hemoglobin 31 pg (25-35)  31 pg (25-35) 


 


Mean Corpuscular Hemoglobin


Concent 36 g/dL


(31-37) 36 g/dL


(31-37)


 


Red Cell Distribution Width


 14.5 %


(11.5-14.5) 14.7 %


(11.5-14.5)


 


Platelet Count


 360 x10^3/uL


(140-400) 323 x10^3/uL


(140-400)


 


Neutrophils (%) (Auto)  59 % (31-73) 


 


Lymphocytes (%) (Auto)  24 % (24-48) 


 


Monocytes (%) (Auto)  16 % (0-9) 


 


Eosinophils (%) (Auto)  2 % (0-3) 


 


Basophils (%) (Auto)  0 % (0-3) 


 


Neutrophils # (Auto)


 


 5.1 x10^3uL


(1.8-7.7)


 


Lymphocytes # (Auto)


 


 2.1 x10^3/uL


(1.0-4.8)


 


Monocytes # (Auto)


 


 1.3 x10^3/uL


(0.0-1.1)


 


Eosinophils # (Auto)


 


 0.1 x10^3/uL


(0.0-0.7)


 


Basophils # (Auto)


 


 0.0 x10^3/uL


(0.0-0.2)


 


Erythrocyte Sedimentation Rate  55 (0-15) 


 


Reticulocyte Count (auto)


 


 0.4 %


(0.5-2.5)


 


Sodium Level


 


 138 mmol/L


(136-145)


 


Potassium Level


 


 4.1 mmol/L


(3.5-5.1)


 


Chloride Level


 


 106 mmol/L


()


 


Carbon Dioxide Level


 


 24 mmol/L


(21-32)


 


Anion Gap  8 (6-14) 


 


Blood Urea Nitrogen


 


 12 mg/dL


(8-26)


 


Creatinine


 


 1.1 mg/dL


(0.7-1.3)


 


Estimated GFR


(Cockcroft-Gault) 


 82.1 





 


BUN/Creatinine Ratio  11 (6-20) 


 


Glucose Level


 


 106 mg/dL


(70-99)


 


Calcium Level


 


 8.1 mg/dL


(8.5-10.1)


 


Total Bilirubin


 


 0.7 mg/dL


(0.2-1.0)


 


Aspartate Amino Transf


(AST/SGOT) 


 22 U/L (15-37) 





 


Alanine Aminotransferase


(ALT/SGPT) 


 26 U/L (16-63) 





 


Alkaline Phosphatase


 


 295 U/L


()


 


C-Reactive Protein,


Quantitative 


 104.2 mg/L


(0-3.3)


 


Total Protein


 


 6.4 g/dL


(6.4-8.2)


 


Albumin


 


 2.6 g/dL


(3.4-5.0)


 


Albumin/Globulin Ratio  0.7 (1.0-1.7) 











Images


Images


CT images and report reviewed. There is a small lateral hematoma seen on series 

4 image 39.





Assessment/Plan


Assessment/Plan


Left lateral hip hematoma with recent revision total hip arthroplasty.





The patient is a 63 year old male with anemia, unknown etiology at this point, 

after recent revision total hip arthroplasty. There is a small lateral hip 

hematoma noted on CT. Exam of left hip is unremarkable with no concern for 

infection. CRP and ESR are elevated, and were reportedly elevated 

preoperatively as well, but are not concerning for infection. I recommended he 

apply ice to the left hip to help the hematoma resolve. He denies hip pain at 

this time. Hematology and GI are on board to help identify reason for anemia. 

The patient states he is having EGD outpatient Tuesday or Wednesday. He has a 

followup with his surgeon at , Dr. Dove, on 9/18/18. No orthopedic 

intervention needed at this time.











BARI AKINS Sep 2, 2018 13:11

## 2018-09-02 NOTE — PDOC
G I PROGRESS NOTE


Reason for Follow-up


Anemia


Subjective


Feeling better S/P transfusions


Physical Exam


Lungs clear


CV S1 S2


ABD +BS, soft, nontender


Review of Relevant


I have reviewed the following items george (where applicable) has been applied.


Labs





Laboratory Tests








Test


 9/1/18


09:36 9/1/18


10:27 9/1/18


10:41 9/1/18


18:45


 


White Blood Count


 9.8 x10^3/uL


(4.0-11.0) 


 


 9.6 x10^3/uL


(4.0-11.0)


 


Red Blood Count


 1.89 x10^6/uL


(4.30-5.70) 


 


 2.49 x10^6/uL


(4.30-5.70)


 


Hemoglobin


 5.8 g/dL


(13.0-17.5) 


 


 7.7 g/dL


(13.0-17.5)


 


Hematocrit


 16.4 %


(39.0-53.0) 


 


 21.6 %


(39.0-53.0)


 


Mean Corpuscular Volume 87 fL ()    87 fL () 


 


Mean Corpuscular Hemoglobin 31 pg (25-35)    31 pg (25-35) 


 


Mean Corpuscular Hemoglobin


Concent 36 g/dL


(31-37) 


 


 36 g/dL


(31-37)


 


Red Cell Distribution Width


 14.3 %


(11.5-14.5) 


 


 14.5 %


(11.5-14.5)


 


Platelet Count


 372 x10^3/uL


(140-400) 


 


 360 x10^3/uL


(140-400)


 


Neutrophils (%) (Auto) 70 % (31-73)    


 


Lymphocytes (%) (Auto) 13 % (24-48)    


 


Monocytes (%) (Auto) 17 % (0-9)    


 


Eosinophils (%) (Auto) 0 % (0-3)    


 


Basophils (%) (Auto) 1 % (0-3)    


 


Neutrophils # (Auto)


 6.9 x10^3uL


(1.8-7.7) 


 


 





 


Lymphocytes # (Auto)


 1.2 x10^3/uL


(1.0-4.8) 


 


 





 


Monocytes # (Auto)


 1.6 x10^3/uL


(0.0-1.1) 


 


 





 


Eosinophils # (Auto)


 0.0 x10^3/uL


(0.0-0.7) 


 


 





 


Basophils # (Auto)


 0.1 x10^3/uL


(0.0-0.2) 


 


 





 


Prothrombin Time


 14.9 SEC


(11.7-14.0) 


 


 





 


Prothromb Time International


Ratio 1.2 (0.8-1.1) 


 


 


 





 


Activated Partial


Thromboplast Time 34 SEC (24-38) 


 


 


 





 


Sodium Level


 136 mmol/L


(136-145) 


 


 





 


Potassium Level


 3.9 mmol/L


(3.5-5.1) 


 


 





 


Chloride Level


 102 mmol/L


() 


 


 





 


Carbon Dioxide Level


 25 mmol/L


(21-32) 


 


 





 


Anion Gap 9 (6-14)    


 


Blood Urea Nitrogen


 10 mg/dL


(8-26) 


 


 





 


Creatinine


 1.2 mg/dL


(0.7-1.3) 


 


 





 


Estimated GFR


(Cockcroft-Gault) 74.2 


 


 


 





 


Glucose Level


 112 mg/dL


(70-99) 


 


 





 


Calcium Level


 8.3 mg/dL


(8.5-10.1) 


 


 





 


Iron Level


 53 ug/dL


() 


 


 





 


Total Iron Binding Capacity


 137 ug/dL


(250-450) 


 


 





 


Iron Saturation 39 % (15-34)    


 


Total Bilirubin


 1.0 mg/dL


(0.2-1.0) 


 


 





 


Direct Bilirubin


 0.3 mg/dL


(0.0-0.2) 


 


 





 


Aspartate Amino Transf


(AST/SGOT) 22 U/L (15-37) 


 


 


 





 


Alanine Aminotransferase


(ALT/SGPT) 27 U/L (16-63) 


 


 


 





 


Alkaline Phosphatase


 324 U/L


() 


 


 





 


Total Protein


 6.9 g/dL


(6.4-8.2) 


 


 





 


Albumin


 2.9 g/dL


(3.4-5.0) 


 


 





 


Urine Collection Type  Void   


 


Urine Color  Yellow   


 


Urine Clarity  Clear   


 


Urine pH  6.5   


 


Urine Specific Gravity  1.010   


 


Urine Protein


 


 Negative mg/dL


(NEG-TRACE) 


 





 


Urine Glucose (UA)


 


 Negative mg/dL


(NEG) 


 





 


Urine Ketones (Stick)


 


 Negative mg/dL


(NEG) 


 





 


Urine Blood  Negative (NEG)   


 


Urine Nitrite  Positive (NEG)   


 


Urine Bilirubin  Negative (NEG)   


 


Urine Urobilinogen Dipstick


 


 4.0 mg/dL (0.2


mg/dL) 


 





 


Urine Leukocyte Esterase  Moderate (NEG)   


 


Urine RBC  Occ /HPF (0-2)   


 


Urine WBC


 


 20-40 /HPF


(0-4) 


 





 


Urine Squamous Epithelial


Cells 


 Few /LPF 


 


 





 


Urine Bacteria


 


 Many /HPF


(0-FEW) 


 





 


Stool Occult Blood   Positive (NEG)  


 


Test


 9/2/18


03:00 


 


 





 


White Blood Count


 8.6 x10^3/uL


(4.0-11.0) 


 


 





 


Red Blood Count


 2.73 x10^6/uL


(4.30-5.70) 


 


 





 


Hemoglobin


 8.5 g/dL


(13.0-17.5) 


 


 





 


Hematocrit


 23.5 %


(39.0-53.0) 


 


 





 


Mean Corpuscular Volume 86 fL ()    


 


Mean Corpuscular Hemoglobin 31 pg (25-35)    


 


Mean Corpuscular Hemoglobin


Concent 36 g/dL


(31-37) 


 


 





 


Red Cell Distribution Width


 14.7 %


(11.5-14.5) 


 


 





 


Platelet Count


 323 x10^3/uL


(140-400) 


 


 





 


Neutrophils (%) (Auto) 59 % (31-73)    


 


Lymphocytes (%) (Auto) 24 % (24-48)    


 


Monocytes (%) (Auto) 16 % (0-9)    


 


Eosinophils (%) (Auto) 2 % (0-3)    


 


Basophils (%) (Auto) 0 % (0-3)    


 


Neutrophils # (Auto)


 5.1 x10^3uL


(1.8-7.7) 


 


 





 


Lymphocytes # (Auto)


 2.1 x10^3/uL


(1.0-4.8) 


 


 





 


Monocytes # (Auto)


 1.3 x10^3/uL


(0.0-1.1) 


 


 





 


Eosinophils # (Auto)


 0.1 x10^3/uL


(0.0-0.7) 


 


 





 


Basophils # (Auto)


 0.0 x10^3/uL


(0.0-0.2) 


 


 





 


Erythrocyte Sedimentation Rate 55 (0-15)    


 


Reticulocyte Count (auto)


 0.4 %


(0.5-2.5) 


 


 





 


Sodium Level


 138 mmol/L


(136-145) 


 


 





 


Potassium Level


 4.1 mmol/L


(3.5-5.1) 


 


 





 


Chloride Level


 106 mmol/L


() 


 


 





 


Carbon Dioxide Level


 24 mmol/L


(21-32) 


 


 





 


Anion Gap 8 (6-14)    


 


Blood Urea Nitrogen


 12 mg/dL


(8-26) 


 


 





 


Creatinine


 1.1 mg/dL


(0.7-1.3) 


 


 





 


Estimated GFR


(Cockcroft-Gault) 82.1 


 


 


 





 


BUN/Creatinine Ratio 11 (6-20)    


 


Glucose Level


 106 mg/dL


(70-99) 


 


 





 


Calcium Level


 8.1 mg/dL


(8.5-10.1) 


 


 





 


Total Bilirubin


 0.7 mg/dL


(0.2-1.0) 


 


 





 


Aspartate Amino Transf


(AST/SGOT) 22 U/L (15-37) 


 


 


 





 


Alanine Aminotransferase


(ALT/SGPT) 26 U/L (16-63) 


 


 


 





 


Alkaline Phosphatase


 295 U/L


() 


 


 





 


C-Reactive Protein,


Quantitative 104.2 mg/L


(0-3.3) 


 


 





 


Total Protein


 6.4 g/dL


(6.4-8.2) 


 


 





 


Albumin


 2.6 g/dL


(3.4-5.0) 


 


 





 


Albumin/Globulin Ratio 0.7 (1.0-1.7)    








Laboratory Tests








Test


 9/1/18


18:45 9/2/18


03:00


 


White Blood Count


 9.6 x10^3/uL


(4.0-11.0) 8.6 x10^3/uL


(4.0-11.0)


 


Red Blood Count


 2.49 x10^6/uL


(4.30-5.70) 2.73 x10^6/uL


(4.30-5.70)


 


Hemoglobin


 7.7 g/dL


(13.0-17.5) 8.5 g/dL


(13.0-17.5)


 


Hematocrit


 21.6 %


(39.0-53.0) 23.5 %


(39.0-53.0)


 


Mean Corpuscular Volume 87 fL ()  86 fL () 


 


Mean Corpuscular Hemoglobin 31 pg (25-35)  31 pg (25-35) 


 


Mean Corpuscular Hemoglobin


Concent 36 g/dL


(31-37) 36 g/dL


(31-37)


 


Red Cell Distribution Width


 14.5 %


(11.5-14.5) 14.7 %


(11.5-14.5)


 


Platelet Count


 360 x10^3/uL


(140-400) 323 x10^3/uL


(140-400)


 


Neutrophils (%) (Auto)  59 % (31-73) 


 


Lymphocytes (%) (Auto)  24 % (24-48) 


 


Monocytes (%) (Auto)  16 % (0-9) 


 


Eosinophils (%) (Auto)  2 % (0-3) 


 


Basophils (%) (Auto)  0 % (0-3) 


 


Neutrophils # (Auto)


 


 5.1 x10^3uL


(1.8-7.7)


 


Lymphocytes # (Auto)


 


 2.1 x10^3/uL


(1.0-4.8)


 


Monocytes # (Auto)


 


 1.3 x10^3/uL


(0.0-1.1)


 


Eosinophils # (Auto)


 


 0.1 x10^3/uL


(0.0-0.7)


 


Basophils # (Auto)


 


 0.0 x10^3/uL


(0.0-0.2)


 


Erythrocyte Sedimentation Rate  55 (0-15) 


 


Reticulocyte Count (auto)


 


 0.4 %


(0.5-2.5)


 


Sodium Level


 


 138 mmol/L


(136-145)


 


Potassium Level


 


 4.1 mmol/L


(3.5-5.1)


 


Chloride Level


 


 106 mmol/L


()


 


Carbon Dioxide Level


 


 24 mmol/L


(21-32)


 


Anion Gap  8 (6-14) 


 


Blood Urea Nitrogen


 


 12 mg/dL


(8-26)


 


Creatinine


 


 1.1 mg/dL


(0.7-1.3)


 


Estimated GFR


(Cockcroft-Gault) 


 82.1 





 


BUN/Creatinine Ratio  11 (6-20) 


 


Glucose Level


 


 106 mg/dL


(70-99)


 


Calcium Level


 


 8.1 mg/dL


(8.5-10.1)


 


Total Bilirubin


 


 0.7 mg/dL


(0.2-1.0)


 


Aspartate Amino Transf


(AST/SGOT) 


 22 U/L (15-37) 





 


Alanine Aminotransferase


(ALT/SGPT) 


 26 U/L (16-63) 





 


Alkaline Phosphatase


 


 295 U/L


()


 


C-Reactive Protein,


Quantitative 


 104.2 mg/L


(0-3.3)


 


Total Protein


 


 6.4 g/dL


(6.4-8.2)


 


Albumin


 


 2.6 g/dL


(3.4-5.0)


 


Albumin/Globulin Ratio  0.7 (1.0-1.7) 








Medications





Current Medications


Acetaminophen (Tylenol) 1,000 mg 1X  ONCE PO  Last administered on 9/1/18at 10:

38;  Start 9/1/18 at 10:45;  Stop 9/1/18 at 10:46;  Status DC


Ondansetron HCl (Zofran) 4 mg PRN Q8HRS  PRN IV NAUSEA/VOMITING;  Start 9/1/18 

at 11:15;  Stop 9/1/18 at 12:04;  Status DC


Acetaminophen (Tylenol) 650 mg PRN Q4HRS  PRN PO FEVER Last administered on 9/1/ 18at 21:27;  Start 9/1/18 at 11:15;  Stop 9/2/18 at 11:14;  Status DC


Levofloxacin (Levaquin) 500 mg DAILY06 PO ;  Start 9/1/18 at 12:00;  Stop 9/1/ 18 at 12:04;  Status DC


Lactobacillus Rhamnosus (Culturelle) 1 cap BID PO  Last administered on 9/2/ 18at 11:28;  Start 9/1/18 at 21:00


Ondansetron HCl (Zofran) 4 mg PRN Q6HRS  PRN IV NAUSEA/VOMITING 1ST CHOICE;  

Start 9/1/18 at 12:15


Levofloxacin/ Dextrose (Levaquin Per Pharmacy) 1 each PRN DAILY  PRN MC SEE 

COMMENTS;  Start 9/1/18 at 12:15


Morphine Sulfate (Morphine Sulfate) 2 mg PRN Q2HR  PRN IV PAIN SEVERE;  Start 9/ 1/18 at 12:15


Acetaminophen/ Hydrocodone Bitart (Lortab 5/325) 1 tab PRN Q4HRS  PRN PO PAIN;  

Start 9/1/18 at 12:15


Diphenhydramine HCl (Benadryl) 25 mg PRN QHS  PRN PO INSOMNIA;  Start 9/1/18 at 

12:15


Levofloxacin (Levaquin) 250 mg DAILY06 PO  Last administered on 9/2/18at 06:20;

  Start 9/1/18 at 13:00


Pantoprazole Sodium (Protonix) 40 mg DAILYAC PO  Last administered on 9/2/18at 

11:28;  Start 9/2/18 at 07:30


Pantoprazole Sodium (Protonix) 40 mg 1X  ONCE PO  Last administered on 9/1/18at 

13:17;  Start 9/1/18 at 12:45;  Stop 9/1/18 at 12:46;  Status DC


Vitals/I & O





Vital Sign - Last 24 Hours








 9/1/18 9/1/18 9/1/18 9/1/18





 13:15 15:15 15:33 15:48


 


Temp 98.9 98.4 98.4 98.2





 98.9 98.4 98.4 98.2


 


Pulse 92 85 84 82


 


Resp 16 18 16 16


 


B/P (MAP) 120/57 115/63 (80) 115/63 110/61


 


Pulse Ox  99  


 


O2 Delivery  Room Air  


 


    





    





 9/1/18 9/1/18 9/1/18 9/1/18





 16:48 17:47 19:05 20:00


 


Temp 98.2 98.1 98.8 





 98.2 98.1 98.8 


 


Pulse 93 89 86 


 


Resp 14 16 18 


 


B/P (MAP) 133/71 132/56 125/70 (88) 


 


Pulse Ox   98 


 


O2 Delivery   Room Air Room Air


 


    





    





 9/1/18 9/1/18 9/1/18 9/1/18





 21:15 21:20 21:35 22:32


 


Temp 98.8 98.3 98.4 98.5





 98.8 98.3 98.4 98.5


 


Pulse 90 85 86 89


 


Resp 18 18 18 18


 


B/P (MAP) 125/70 139/72 127/75 158/79


 


    





    





 9/1/18 9/1/18 9/2/18 9/2/18





 23:00 23:31 00:30 03:50


 


Temp  98.7 98.3 98.7





  98.7 98.3 98.7


 


Pulse  86 83 80


 


Resp  19 18 18


 


B/P (MAP) 158/79 (105) 133/74 144/87 128/75 (92)


 


Pulse Ox    96


 


O2 Delivery    Room Air


 


    





    





 9/2/18 9/2/18 9/2/18 





 07:46 08:00 10:47 


 


Temp 98.0  97.9 





 98.0  97.9 


 


Pulse 78  81 


 


Resp 20  18 


 


B/P (MAP) 124/68 (86)  130/75 (93) 


 


Pulse Ox 97  98 


 


O2 Delivery Room Air Room Air Room Air 














Intake and Output   


 


 9/1/18 9/1/18 9/2/18





 15:00 23:00 07:00


 


Intake Total 310 ml 2010 ml 


 


Output Total 1250 ml 975 ml 1550 ml


 


Balance -940 ml 1035 ml -1550 ml








Problem List


Problems


Medical Problems:


(1) Anemia


Status: Acute  





(2) GI bleed


Status: Acute  





(3) Urinary tract infection


Status: Acute  








Assessment


Anemia- with mixed indices, endoscopic evaluation to assess for GI source, EGD/

colonoscopy as O/P.My office to arrange If unrevealing, SB series/capsule 

endoscopy as well as bone marrow biopsy may be needed











ROHINI DENTON MD Sep 2, 2018 12:39

## 2018-09-02 NOTE — PDOC
PROGRESS NOTES


Chief Complaint


Chief Complaint


Acute precipitous drop in hemoglobin


Hemoccult-positive


Melena


Recent hip surgery 2 months ago


Postop seroma or hematoma left hip


Abnormal CT leg, rule out metastatic process


Bronchitis


Osteoarthritis





History of Present Illness


History of Present Illness


Pt seen and examined


Dw RN


MARISELAS


Pt states he is ready to go home and is to follow up w GI outpt





Vitals


Vitals





Vital Signs








  Date Time  Temp Pulse Resp B/P (MAP) Pulse Ox O2 Delivery O2 Flow Rate FiO2


 


9/2/18 10:47 97.9 81 18 130/75 (93) 98 Room Air  





 97.9       











Physical Exam


General:  Alert, Oriented X3, Cooperative, No acute distress


Heart:  Regular rate, No murmurs


Lungs:  Clear


Abdomen:  Soft, No tenderness


Extremities:  No clubbing, No cyanosis, Normal pulses


Skin:  No rashes, No breakdown, No significant lesion





Labs


LABS





Laboratory Tests








Test


 9/1/18


18:45 9/2/18


03:00


 


White Blood Count


 9.6 x10^3/uL


(4.0-11.0) 8.6 x10^3/uL


(4.0-11.0)


 


Red Blood Count


 2.49 x10^6/uL


(4.30-5.70) 2.73 x10^6/uL


(4.30-5.70)


 


Hemoglobin


 7.7 g/dL


(13.0-17.5) 8.5 g/dL


(13.0-17.5)


 


Hematocrit


 21.6 %


(39.0-53.0) 23.5 %


(39.0-53.0)


 


Mean Corpuscular Volume 87 fL ()  86 fL () 


 


Mean Corpuscular Hemoglobin 31 pg (25-35)  31 pg (25-35) 


 


Mean Corpuscular Hemoglobin


Concent 36 g/dL


(31-37) 36 g/dL


(31-37)


 


Red Cell Distribution Width


 14.5 %


(11.5-14.5) 14.7 %


(11.5-14.5)


 


Platelet Count


 360 x10^3/uL


(140-400) 323 x10^3/uL


(140-400)


 


Neutrophils (%) (Auto)  59 % (31-73) 


 


Lymphocytes (%) (Auto)  24 % (24-48) 


 


Monocytes (%) (Auto)  16 % (0-9) 


 


Eosinophils (%) (Auto)  2 % (0-3) 


 


Basophils (%) (Auto)  0 % (0-3) 


 


Neutrophils # (Auto)


 


 5.1 x10^3uL


(1.8-7.7)


 


Lymphocytes # (Auto)


 


 2.1 x10^3/uL


(1.0-4.8)


 


Monocytes # (Auto)


 


 1.3 x10^3/uL


(0.0-1.1)


 


Eosinophils # (Auto)


 


 0.1 x10^3/uL


(0.0-0.7)


 


Basophils # (Auto)


 


 0.0 x10^3/uL


(0.0-0.2)


 


Erythrocyte Sedimentation Rate  55 (0-15) 


 


Reticulocyte Count (auto)


 


 0.4 %


(0.5-2.5)


 


Sodium Level


 


 138 mmol/L


(136-145)


 


Potassium Level


 


 4.1 mmol/L


(3.5-5.1)


 


Chloride Level


 


 106 mmol/L


()


 


Carbon Dioxide Level


 


 24 mmol/L


(21-32)


 


Anion Gap  8 (6-14) 


 


Blood Urea Nitrogen


 


 12 mg/dL


(8-26)


 


Creatinine


 


 1.1 mg/dL


(0.7-1.3)


 


Estimated GFR


(Cockcroft-Gault) 


 82.1 





 


BUN/Creatinine Ratio  11 (6-20) 


 


Glucose Level


 


 106 mg/dL


(70-99)


 


Calcium Level


 


 8.1 mg/dL


(8.5-10.1)


 


Total Bilirubin


 


 0.7 mg/dL


(0.2-1.0)


 


Aspartate Amino Transf


(AST/SGOT) 


 22 U/L (15-37) 





 


Alanine Aminotransferase


(ALT/SGPT) 


 26 U/L (16-63) 





 


Alkaline Phosphatase


 


 295 U/L


()


 


C-Reactive Protein,


Quantitative 


 104.2 mg/L


(0-3.3)


 


Total Protein


 


 6.4 g/dL


(6.4-8.2)


 


Albumin


 


 2.6 g/dL


(3.4-5.0)


 


Albumin/Globulin Ratio  0.7 (1.0-1.7) 











Review of Systems


Review of Systems


CO fatigue


CO hunger





Assessment and Plan


Assessmemt and Plan


Problems


Medical Problems:


(1) Anemia


Status: Acute  





(2) GI bleed


Status: Acute  





(3) Urinary tract infection


Status: Acute  





Acute precipitous drop in hemoglobin


Hemoccult-positive


Melena


Recent hip surgery 2 months ago


Postop seroma or hematoma left hip


Abnormal CT leg, rule out metastatic process


Bronchitis


Osteoarthritis





Plan:


Monitor labs


PT/OT


Home meds


[facility] GI and ortho outpt


Probable D/C to home today


Appreciate subspecialist input








Comment


Review of Relevant


I have reviewed the following items george (where applicable) has been applied.


Labs





Laboratory Tests








Test


 9/1/18


09:36 9/1/18


10:27 9/1/18


10:41 9/1/18


18:45


 


White Blood Count


 9.8 x10^3/uL


(4.0-11.0) 


 


 9.6 x10^3/uL


(4.0-11.0)


 


Red Blood Count


 1.89 x10^6/uL


(4.30-5.70) 


 


 2.49 x10^6/uL


(4.30-5.70)


 


Hemoglobin


 5.8 g/dL


(13.0-17.5) 


 


 7.7 g/dL


(13.0-17.5)


 


Hematocrit


 16.4 %


(39.0-53.0) 


 


 21.6 %


(39.0-53.0)


 


Mean Corpuscular Volume 87 fL ()    87 fL () 


 


Mean Corpuscular Hemoglobin 31 pg (25-35)    31 pg (25-35) 


 


Mean Corpuscular Hemoglobin


Concent 36 g/dL


(31-37) 


 


 36 g/dL


(31-37)


 


Red Cell Distribution Width


 14.3 %


(11.5-14.5) 


 


 14.5 %


(11.5-14.5)


 


Platelet Count


 372 x10^3/uL


(140-400) 


 


 360 x10^3/uL


(140-400)


 


Neutrophils (%) (Auto) 70 % (31-73)    


 


Lymphocytes (%) (Auto) 13 % (24-48)    


 


Monocytes (%) (Auto) 17 % (0-9)    


 


Eosinophils (%) (Auto) 0 % (0-3)    


 


Basophils (%) (Auto) 1 % (0-3)    


 


Neutrophils # (Auto)


 6.9 x10^3uL


(1.8-7.7) 


 


 





 


Lymphocytes # (Auto)


 1.2 x10^3/uL


(1.0-4.8) 


 


 





 


Monocytes # (Auto)


 1.6 x10^3/uL


(0.0-1.1) 


 


 





 


Eosinophils # (Auto)


 0.0 x10^3/uL


(0.0-0.7) 


 


 





 


Basophils # (Auto)


 0.1 x10^3/uL


(0.0-0.2) 


 


 





 


Prothrombin Time


 14.9 SEC


(11.7-14.0) 


 


 





 


Prothromb Time International


Ratio 1.2 (0.8-1.1) 


 


 


 





 


Activated Partial


Thromboplast Time 34 SEC (24-38) 


 


 


 





 


Sodium Level


 136 mmol/L


(136-145) 


 


 





 


Potassium Level


 3.9 mmol/L


(3.5-5.1) 


 


 





 


Chloride Level


 102 mmol/L


() 


 


 





 


Carbon Dioxide Level


 25 mmol/L


(21-32) 


 


 





 


Anion Gap 9 (6-14)    


 


Blood Urea Nitrogen


 10 mg/dL


(8-26) 


 


 





 


Creatinine


 1.2 mg/dL


(0.7-1.3) 


 


 





 


Estimated GFR


(Cockcroft-Gault) 74.2 


 


 


 





 


Glucose Level


 112 mg/dL


(70-99) 


 


 





 


Calcium Level


 8.3 mg/dL


(8.5-10.1) 


 


 





 


Iron Level


 53 ug/dL


() 


 


 





 


Total Iron Binding Capacity


 137 ug/dL


(250-450) 


 


 





 


Iron Saturation 39 % (15-34)    


 


Total Bilirubin


 1.0 mg/dL


(0.2-1.0) 


 


 





 


Direct Bilirubin


 0.3 mg/dL


(0.0-0.2) 


 


 





 


Aspartate Amino Transf


(AST/SGOT) 22 U/L (15-37) 


 


 


 





 


Alanine Aminotransferase


(ALT/SGPT) 27 U/L (16-63) 


 


 


 





 


Alkaline Phosphatase


 324 U/L


() 


 


 





 


Total Protein


 6.9 g/dL


(6.4-8.2) 


 


 





 


Albumin


 2.9 g/dL


(3.4-5.0) 


 


 





 


Urine Collection Type  Void   


 


Urine Color  Yellow   


 


Urine Clarity  Clear   


 


Urine pH  6.5   


 


Urine Specific Gravity  1.010   


 


Urine Protein


 


 Negative mg/dL


(NEG-TRACE) 


 





 


Urine Glucose (UA)


 


 Negative mg/dL


(NEG) 


 





 


Urine Ketones (Stick)


 


 Negative mg/dL


(NEG) 


 





 


Urine Blood  Negative (NEG)   


 


Urine Nitrite  Positive (NEG)   


 


Urine Bilirubin  Negative (NEG)   


 


Urine Urobilinogen Dipstick


 


 4.0 mg/dL (0.2


mg/dL) 


 





 


Urine Leukocyte Esterase  Moderate (NEG)   


 


Urine RBC  Occ /HPF (0-2)   


 


Urine WBC


 


 20-40 /HPF


(0-4) 


 





 


Urine Squamous Epithelial


Cells 


 Few /LPF 


 


 





 


Urine Bacteria


 


 Many /HPF


(0-FEW) 


 





 


Stool Occult Blood   Positive (NEG)  


 


Test


 9/2/18


03:00 


 


 





 


White Blood Count


 8.6 x10^3/uL


(4.0-11.0) 


 


 





 


Red Blood Count


 2.73 x10^6/uL


(4.30-5.70) 


 


 





 


Hemoglobin


 8.5 g/dL


(13.0-17.5) 


 


 





 


Hematocrit


 23.5 %


(39.0-53.0) 


 


 





 


Mean Corpuscular Volume 86 fL ()    


 


Mean Corpuscular Hemoglobin 31 pg (25-35)    


 


Mean Corpuscular Hemoglobin


Concent 36 g/dL


(31-37) 


 


 





 


Red Cell Distribution Width


 14.7 %


(11.5-14.5) 


 


 





 


Platelet Count


 323 x10^3/uL


(140-400) 


 


 





 


Neutrophils (%) (Auto) 59 % (31-73)    


 


Lymphocytes (%) (Auto) 24 % (24-48)    


 


Monocytes (%) (Auto) 16 % (0-9)    


 


Eosinophils (%) (Auto) 2 % (0-3)    


 


Basophils (%) (Auto) 0 % (0-3)    


 


Neutrophils # (Auto)


 5.1 x10^3uL


(1.8-7.7) 


 


 





 


Lymphocytes # (Auto)


 2.1 x10^3/uL


(1.0-4.8) 


 


 





 


Monocytes # (Auto)


 1.3 x10^3/uL


(0.0-1.1) 


 


 





 


Eosinophils # (Auto)


 0.1 x10^3/uL


(0.0-0.7) 


 


 





 


Basophils # (Auto)


 0.0 x10^3/uL


(0.0-0.2) 


 


 





 


Erythrocyte Sedimentation Rate 55 (0-15)    


 


Reticulocyte Count (auto)


 0.4 %


(0.5-2.5) 


 


 





 


Sodium Level


 138 mmol/L


(136-145) 


 


 





 


Potassium Level


 4.1 mmol/L


(3.5-5.1) 


 


 





 


Chloride Level


 106 mmol/L


() 


 


 





 


Carbon Dioxide Level


 24 mmol/L


(21-32) 


 


 





 


Anion Gap 8 (6-14)    


 


Blood Urea Nitrogen


 12 mg/dL


(8-26) 


 


 





 


Creatinine


 1.1 mg/dL


(0.7-1.3) 


 


 





 


Estimated GFR


(Cockcroft-Gault) 82.1 


 


 


 





 


BUN/Creatinine Ratio 11 (6-20)    


 


Glucose Level


 106 mg/dL


(70-99) 


 


 





 


Calcium Level


 8.1 mg/dL


(8.5-10.1) 


 


 





 


Total Bilirubin


 0.7 mg/dL


(0.2-1.0) 


 


 





 


Aspartate Amino Transf


(AST/SGOT) 22 U/L (15-37) 


 


 


 





 


Alanine Aminotransferase


(ALT/SGPT) 26 U/L (16-63) 


 


 


 





 


Alkaline Phosphatase


 295 U/L


() 


 


 





 


C-Reactive Protein,


Quantitative 104.2 mg/L


(0-3.3) 


 


 





 


Total Protein


 6.4 g/dL


(6.4-8.2) 


 


 





 


Albumin


 2.6 g/dL


(3.4-5.0) 


 


 





 


Albumin/Globulin Ratio 0.7 (1.0-1.7)    








Laboratory Tests








Test


 9/1/18


18:45 9/2/18


03:00


 


White Blood Count


 9.6 x10^3/uL


(4.0-11.0) 8.6 x10^3/uL


(4.0-11.0)


 


Red Blood Count


 2.49 x10^6/uL


(4.30-5.70) 2.73 x10^6/uL


(4.30-5.70)


 


Hemoglobin


 7.7 g/dL


(13.0-17.5) 8.5 g/dL


(13.0-17.5)


 


Hematocrit


 21.6 %


(39.0-53.0) 23.5 %


(39.0-53.0)


 


Mean Corpuscular Volume 87 fL ()  86 fL () 


 


Mean Corpuscular Hemoglobin 31 pg (25-35)  31 pg (25-35) 


 


Mean Corpuscular Hemoglobin


Concent 36 g/dL


(31-37) 36 g/dL


(31-37)


 


Red Cell Distribution Width


 14.5 %


(11.5-14.5) 14.7 %


(11.5-14.5)


 


Platelet Count


 360 x10^3/uL


(140-400) 323 x10^3/uL


(140-400)


 


Neutrophils (%) (Auto)  59 % (31-73) 


 


Lymphocytes (%) (Auto)  24 % (24-48) 


 


Monocytes (%) (Auto)  16 % (0-9) 


 


Eosinophils (%) (Auto)  2 % (0-3) 


 


Basophils (%) (Auto)  0 % (0-3) 


 


Neutrophils # (Auto)


 


 5.1 x10^3uL


(1.8-7.7)


 


Lymphocytes # (Auto)


 


 2.1 x10^3/uL


(1.0-4.8)


 


Monocytes # (Auto)


 


 1.3 x10^3/uL


(0.0-1.1)


 


Eosinophils # (Auto)


 


 0.1 x10^3/uL


(0.0-0.7)


 


Basophils # (Auto)


 


 0.0 x10^3/uL


(0.0-0.2)


 


Erythrocyte Sedimentation Rate  55 (0-15) 


 


Reticulocyte Count (auto)


 


 0.4 %


(0.5-2.5)


 


Sodium Level


 


 138 mmol/L


(136-145)


 


Potassium Level


 


 4.1 mmol/L


(3.5-5.1)


 


Chloride Level


 


 106 mmol/L


()


 


Carbon Dioxide Level


 


 24 mmol/L


(21-32)


 


Anion Gap  8 (6-14) 


 


Blood Urea Nitrogen


 


 12 mg/dL


(8-26)


 


Creatinine


 


 1.1 mg/dL


(0.7-1.3)


 


Estimated GFR


(Cockcroft-Gault) 


 82.1 





 


BUN/Creatinine Ratio  11 (6-20) 


 


Glucose Level


 


 106 mg/dL


(70-99)


 


Calcium Level


 


 8.1 mg/dL


(8.5-10.1)


 


Total Bilirubin


 


 0.7 mg/dL


(0.2-1.0)


 


Aspartate Amino Transf


(AST/SGOT) 


 22 U/L (15-37) 





 


Alanine Aminotransferase


(ALT/SGPT) 


 26 U/L (16-63) 





 


Alkaline Phosphatase


 


 295 U/L


()


 


C-Reactive Protein,


Quantitative 


 104.2 mg/L


(0-3.3)


 


Total Protein


 


 6.4 g/dL


(6.4-8.2)


 


Albumin


 


 2.6 g/dL


(3.4-5.0)


 


Albumin/Globulin Ratio  0.7 (1.0-1.7) 








Medications





Current Medications


Acetaminophen (Tylenol) 1,000 mg 1X  ONCE PO  Last administered on 9/1/18at 10:

38;  Start 9/1/18 at 10:45;  Stop 9/1/18 at 10:46;  Status DC


Ondansetron HCl (Zofran) 4 mg PRN Q8HRS  PRN IV NAUSEA/VOMITING;  Start 9/1/18 

at 11:15;  Stop 9/1/18 at 12:04;  Status DC


Acetaminophen (Tylenol) 650 mg PRN Q4HRS  PRN PO FEVER Last administered on 9/1/ 18at 21:27;  Start 9/1/18 at 11:15;  Stop 9/2/18 at 11:14;  Status DC


Levofloxacin (Levaquin) 500 mg DAILY06 PO ;  Start 9/1/18 at 12:00;  Stop 9/1/ 18 at 12:04;  Status DC


Lactobacillus Rhamnosus (Culturelle) 1 cap BID PO  Last administered on 9/2/ 18at 11:28;  Start 9/1/18 at 21:00


Ondansetron HCl (Zofran) 4 mg PRN Q6HRS  PRN IV NAUSEA/VOMITING 1ST CHOICE;  

Start 9/1/18 at 12:15


Levofloxacin/ Dextrose (Levaquin Per Pharmacy) 1 each PRN DAILY  PRN [facility] SEE 

COMMENTS;  Start 9/1/18 at 12:15


Morphine Sulfate (Morphine Sulfate) 2 mg PRN Q2HR  PRN IV PAIN SEVERE;  Start 9/ 1/18 at 12:15


Acetaminophen/ Hydrocodone Bitart (Lortab 5/325) 1 tab PRN Q4HRS  PRN PO PAIN;  

Start 9/1/18 at 12:15


Diphenhydramine HCl (Benadryl) 25 mg PRN QHS  PRN PO INSOMNIA;  Start 9/1/18 at 

12:15


Levofloxacin (Levaquin) 250 mg DAILY06 PO  Last administered on 9/2/18at 06:20;

  Start 9/1/18 at 13:00


Pantoprazole Sodium (Protonix) 40 mg DAILYAC PO  Last administered on 9/2/18at 

11:28;  Start 9/2/18 at 07:30


Pantoprazole Sodium (Protonix) 40 mg 1X  ONCE PO  Last administered on 9/1/18at 

13:17;  Start 9/1/18 at 12:45;  Stop 9/1/18 at 12:46;  Status DC





Active Scripts


Active


Reported


Levaquin (Levofloxacin) 500 Mg Tablet 250 Mg PO DAILY


Vitals/I & O





Vital Sign - Last 24 Hours








 9/1/18 9/1/18 9/1/18 9/1/18





 15:15 15:33 15:48 16:48


 


Temp 98.4 98.4 98.2 98.2





 98.4 98.4 98.2 98.2


 


Pulse 85 84 82 93


 


Resp 18 16 16 14


 


B/P (MAP) 115/63 (80) 115/63 110/61 133/71


 


Pulse Ox 99   


 


O2 Delivery Room Air   


 


    





    





 9/1/18 9/1/18 9/1/18 9/1/18





 17:47 19:05 20:00 21:15


 


Temp 98.1 98.8  98.8





 98.1 98.8  98.8


 


Pulse 89 86  90


 


Resp 16 18  18


 


B/P (MAP) 132/56 125/70 (88)  125/70


 


Pulse Ox  98  


 


O2 Delivery  Room Air Room Air 


 


    





    





 9/1/18 9/1/18 9/1/18 9/1/18





 21:20 21:35 22:32 23:00


 


Temp 98.3 98.4 98.5 





 98.3 98.4 98.5 


 


Pulse 85 86 89 


 


Resp 18 18 18 


 


B/P (MAP) 139/72 127/75 158/79 158/79 (105)


 


    





    





 9/1/18 9/2/18 9/2/18 9/2/18





 23:31 00:30 03:50 07:46


 


Temp 98.7 98.3 98.7 98.0





 98.7 98.3 98.7 98.0


 


Pulse 86 83 80 78


 


Resp 19 18 18 20


 


B/P (MAP) 133/74 144/87 128/75 (92) 124/68 (86)


 


Pulse Ox   96 97


 


O2 Delivery   Room Air Room Air


 


    





    





 9/2/18 9/2/18  





 08:00 10:47  


 


Temp  97.9  





  97.9  


 


Pulse  81  


 


Resp  18  


 


B/P (MAP)  130/75 (93)  


 


Pulse Ox  98  


 


O2 Delivery Room Air Room Air  














Intake and Output   


 


 9/1/18 9/1/18 9/2/18





 15:00 23:00 07:00


 


Intake Total 310 ml 2010 ml 


 


Output Total 1250 ml 975 ml 1550 ml


 


Balance -940 ml 1035 ml -1550 ml

















BETO CHARLES III DO Sep 2, 2018 13:58

## 2018-09-12 ENCOUNTER — HOSPITAL ENCOUNTER (OUTPATIENT)
Dept: HOSPITAL 61 - ENDOS | Age: 63
Discharge: HOME | End: 2018-09-12
Attending: INTERNAL MEDICINE
Payer: COMMERCIAL

## 2018-09-12 VITALS — DIASTOLIC BLOOD PRESSURE: 83 MMHG | SYSTOLIC BLOOD PRESSURE: 125 MMHG

## 2018-09-12 DIAGNOSIS — I10: ICD-10-CM

## 2018-09-12 DIAGNOSIS — Z96.642: ICD-10-CM

## 2018-09-12 DIAGNOSIS — M19.90: ICD-10-CM

## 2018-09-12 DIAGNOSIS — Z79.2: ICD-10-CM

## 2018-09-12 DIAGNOSIS — K44.9: ICD-10-CM

## 2018-09-12 DIAGNOSIS — Z79.899: ICD-10-CM

## 2018-09-12 DIAGNOSIS — K64.0: Primary | ICD-10-CM

## 2018-09-12 DIAGNOSIS — D50.0: ICD-10-CM

## 2018-09-12 PROCEDURE — 43235 EGD DIAGNOSTIC BRUSH WASH: CPT

## 2018-09-12 PROCEDURE — 45378 DIAGNOSTIC COLONOSCOPY: CPT

## 2018-10-08 ENCOUNTER — HOSPITAL ENCOUNTER (INPATIENT)
Dept: HOSPITAL 61 - ER | Age: 63
LOS: 2 days | Discharge: HOME | DRG: 812 | End: 2018-10-10
Attending: INTERNAL MEDICINE | Admitting: INTERNAL MEDICINE
Payer: COMMERCIAL

## 2018-10-08 VITALS — SYSTOLIC BLOOD PRESSURE: 109 MMHG | DIASTOLIC BLOOD PRESSURE: 68 MMHG

## 2018-10-08 VITALS — DIASTOLIC BLOOD PRESSURE: 71 MMHG | SYSTOLIC BLOOD PRESSURE: 120 MMHG

## 2018-10-08 VITALS — DIASTOLIC BLOOD PRESSURE: 71 MMHG | SYSTOLIC BLOOD PRESSURE: 113 MMHG

## 2018-10-08 VITALS — DIASTOLIC BLOOD PRESSURE: 77 MMHG | SYSTOLIC BLOOD PRESSURE: 125 MMHG

## 2018-10-08 VITALS — SYSTOLIC BLOOD PRESSURE: 109 MMHG | DIASTOLIC BLOOD PRESSURE: 69 MMHG

## 2018-10-08 VITALS — DIASTOLIC BLOOD PRESSURE: 73 MMHG | SYSTOLIC BLOOD PRESSURE: 113 MMHG

## 2018-10-08 VITALS — DIASTOLIC BLOOD PRESSURE: 75 MMHG | SYSTOLIC BLOOD PRESSURE: 121 MMHG

## 2018-10-08 VITALS — DIASTOLIC BLOOD PRESSURE: 68 MMHG | SYSTOLIC BLOOD PRESSURE: 118 MMHG

## 2018-10-08 VITALS — SYSTOLIC BLOOD PRESSURE: 138 MMHG | DIASTOLIC BLOOD PRESSURE: 75 MMHG

## 2018-10-08 VITALS — DIASTOLIC BLOOD PRESSURE: 70 MMHG | SYSTOLIC BLOOD PRESSURE: 107 MMHG

## 2018-10-08 VITALS — WEIGHT: 200.01 LBS | HEIGHT: 74 IN | BODY MASS INDEX: 25.67 KG/M2

## 2018-10-08 VITALS — SYSTOLIC BLOOD PRESSURE: 119 MMHG | DIASTOLIC BLOOD PRESSURE: 71 MMHG

## 2018-10-08 DIAGNOSIS — M19.90: ICD-10-CM

## 2018-10-08 DIAGNOSIS — M54.9: ICD-10-CM

## 2018-10-08 DIAGNOSIS — Z87.891: ICD-10-CM

## 2018-10-08 DIAGNOSIS — I10: ICD-10-CM

## 2018-10-08 DIAGNOSIS — D64.9: Primary | ICD-10-CM

## 2018-10-08 DIAGNOSIS — Z96.642: ICD-10-CM

## 2018-10-08 DIAGNOSIS — Z82.49: ICD-10-CM

## 2018-10-08 LAB
ALBUMIN SERPL-MCNC: 2.9 G/DL (ref 3.4–5)
ALBUMIN/GLOB SERPL: 0.9 {RATIO} (ref 1–1.7)
ALP SERPL-CCNC: 98 U/L (ref 46–116)
ALT SERPL-CCNC: 18 U/L (ref 16–63)
ANION GAP SERPL CALC-SCNC: 9 MMOL/L (ref 6–14)
AST SERPL-CCNC: 17 U/L (ref 15–37)
BASOPHILS # BLD AUTO: 0 X10^3/UL (ref 0–0.2)
BASOPHILS NFR BLD: 1 % (ref 0–3)
BILIRUB SERPL-MCNC: 1 MG/DL (ref 0.2–1)
BUN SERPL-MCNC: 18 MG/DL (ref 8–26)
BUN/CREAT SERPL: 18 (ref 6–20)
CALCIUM SERPL-MCNC: 8.5 MG/DL (ref 8.5–10.1)
CHLORIDE SERPL-SCNC: 106 MMOL/L (ref 98–107)
CO2 SERPL-SCNC: 26 MMOL/L (ref 21–32)
CREAT SERPL-MCNC: 1 MG/DL (ref 0.7–1.3)
EOSINOPHIL NFR BLD: 0 % (ref 0–3)
EOSINOPHIL NFR BLD: 0 X10^3/UL (ref 0–0.7)
ERYTHROCYTE [DISTWIDTH] IN BLOOD BY AUTOMATED COUNT: 15.5 % (ref 11.5–14.5)
GFR SERPLBLD BASED ON 1.73 SQ M-ARVRAT: 91.6 ML/MIN
GLOBULIN SER-MCNC: 3.4 G/DL (ref 2.2–3.8)
GLUCOSE SERPL-MCNC: 112 MG/DL (ref 70–99)
HCT VFR BLD CALC: 12.2 % (ref 39–53)
HGB BLD-MCNC: 4.4 G/DL (ref 13–17.5)
LYMPHOCYTES # BLD: 0.8 X10^3/UL (ref 1–4.8)
LYMPHOCYTES NFR BLD AUTO: 12 % (ref 24–48)
MCH RBC QN AUTO: 31 PG (ref 25–35)
MCHC RBC AUTO-ENTMCNC: 36 G/DL (ref 31–37)
MCV RBC AUTO: 86 FL (ref 79–100)
MONO #: 0.6 X10^3/UL (ref 0–1.1)
MONOCYTES NFR BLD: 9 % (ref 0–9)
NEUT #: 4.9 X10^3UL (ref 1.8–7.7)
NEUTROPHILS NFR BLD AUTO: 78 % (ref 31–73)
PLATELET # BLD AUTO: 232 X10^3/UL (ref 140–400)
POTASSIUM SERPL-SCNC: 3.9 MMOL/L (ref 3.5–5.1)
PROT SERPL-MCNC: 6.3 G/DL (ref 6.4–8.2)
PROTHROMBIN TIME: 15.3 SEC (ref 11.7–14)
RBC # BLD AUTO: 1.43 X10^6/UL (ref 4.3–5.7)
SODIUM SERPL-SCNC: 141 MMOL/L (ref 136–145)
WBC # BLD AUTO: 6.3 X10^3/UL (ref 4–11)

## 2018-10-08 PROCEDURE — 86920 COMPATIBILITY TEST SPIN: CPT

## 2018-10-08 PROCEDURE — 85610 PROTHROMBIN TIME: CPT

## 2018-10-08 PROCEDURE — 30233N1 TRANSFUSION OF NONAUTOLOGOUS RED BLOOD CELLS INTO PERIPHERAL VEIN, PERCUTANEOUS APPROACH: ICD-10-PCS | Performed by: INTERNAL MEDICINE

## 2018-10-08 PROCEDURE — 86850 RBC ANTIBODY SCREEN: CPT

## 2018-10-08 PROCEDURE — 38222 DX BONE MARROW BX & ASPIR: CPT

## 2018-10-08 PROCEDURE — 85025 COMPLETE CBC W/AUTO DIFF WBC: CPT

## 2018-10-08 PROCEDURE — 80053 COMPREHEN METABOLIC PANEL: CPT

## 2018-10-08 PROCEDURE — 88185 FLOWCYTOMETRY/TC ADD-ON: CPT

## 2018-10-08 PROCEDURE — 85027 COMPLETE CBC AUTOMATED: CPT

## 2018-10-08 PROCEDURE — 71046 X-RAY EXAM CHEST 2 VIEWS: CPT

## 2018-10-08 PROCEDURE — 88184 FLOWCYTOMETRY/ TC 1 MARKER: CPT

## 2018-10-08 PROCEDURE — P9016 RBC LEUKOCYTES REDUCED: HCPCS

## 2018-10-08 PROCEDURE — 36415 COLL VENOUS BLD VENIPUNCTURE: CPT

## 2018-10-08 PROCEDURE — 77012 CT SCAN FOR NEEDLE BIOPSY: CPT

## 2018-10-08 PROCEDURE — 86747 PARVOVIRUS ANTIBODY: CPT

## 2018-10-08 PROCEDURE — 99152 MOD SED SAME PHYS/QHP 5/>YRS: CPT

## 2018-10-08 PROCEDURE — 84484 ASSAY OF TROPONIN QUANT: CPT

## 2018-10-08 PROCEDURE — 86900 BLOOD TYPING SEROLOGIC ABO: CPT

## 2018-10-08 PROCEDURE — 85018 HEMOGLOBIN: CPT

## 2018-10-08 PROCEDURE — 86901 BLOOD TYPING SEROLOGIC RH(D): CPT

## 2018-10-08 PROCEDURE — 88237 TISSUE CULTURE BONE MARROW: CPT

## 2018-10-08 PROCEDURE — 93005 ELECTROCARDIOGRAM TRACING: CPT

## 2018-10-08 PROCEDURE — 85045 AUTOMATED RETICULOCYTE COUNT: CPT

## 2018-10-08 PROCEDURE — 85014 HEMATOCRIT: CPT

## 2018-10-08 NOTE — HP
ADMIT DATE:  10/08/2018



CHIEF COMPLAINT:  Anemia.



HISTORY OF PRESENT ILLNESS:  The patient is a pleasant middle-aged 

American male who has chronic anemia.  He has been seen by Oncology and in fact

they are considering doing a bone marrow biopsy tomorrow.  Today's hemoglobin is

4.  He was told to go to the ER for admission.  We have now examined the

patient.  He is going to get transfusions.  He rates his symptoms at 7/10.  He

has associated shortness of breath.  He tried increasing his home meds, but that

was not working.  We are going to admit the patient and consult Oncology and

transfuse him.



PAST MEDICAL HISTORY:  Chronic anemia and he is being evaluated for that by

Hematology/Oncology, hypertension, kidney failure, arthritis, hip replacement,

back surgery.



ALLERGIES:  None.



FAMILY HISTORY:  Coronary artery disease.



SOCIAL HISTORY:  He does not drink, smoke or take drugs.



MEDICATIONS:  Reviewed, please refer to the MRAD.



REVIEW OF SYSTEMS:  

GENERAL:  No history of weight change, weakness or fevers.

SKIN:  No bruising, hair changes or rashes.

EYES:  No blurred, double or loss of vision.

NOSE AND THROAT:  No history of nosebleeds, hoarseness or sore throat.

HEART:  No history of palpitations, chest pain or shortness of breath on

exertion.

LUNGS:  He complains of shortness of breath.

GASTROINTESTINAL:  Denies changes in appetite, nausea, vomiting, diarrhea or

constipation.

GENITOURINARY:  No history of frequency, urgency, hesitancy or nocturia.

NEUROLOGIC:  Denies history of numbness, tingling, tremor or weakness.

PSYCHIATRIC:  No history of panic, anxiety or depression.

ENDOCRINE:  No history of heat or cold intolerance, polyuria or polydipsia.

EXTREMITIES:  Denies muscle weakness, joint pain, pain on walking or stiffness.



PHYSICAL EXAMINATION:

VITAL SIGNS:  Temperature afebrile, pulse 90, respirations 18, blood pressure

120/71.

GENERAL:  He is alert, cooperative, watching TV.

HEART:  Normal S1, S2.

LUNGS:  Clear.

ABDOMEN:  Soft.

EXTREMITIES:  No edema.

SKIN:  No rashes.

ENDOCRINE:  No thyromegaly.

LYMPHATICS:  No cervical nodes.

HEMATOPOIETIC:  No bruising.



LABORATORY DATA:  Electrolytes are normal today.  Hemoglobin is 4.4.



ASSESSMENT AND PLAN:  Severe chronic anemia, suspect he may have a bone marrow

process.  The patient is being admitted.  We will transfuse him and consult

Hematology/Oncology.  I suspect he will get a bone marrow biopsy tomorrow. 

Frequent labs, home meds, IV fluids, PT, OT.



PROGNOSIS:  Guarded.

 



______________________________

BETO CHARLES DO



DR:  [email or web address]  JOB#:  [id number] / [id number]

DD:  10/08/2018 21:13  DT:  10/08/2018 21:48

## 2018-10-08 NOTE — PHYS DOC
Past Medical History


Past Medical History:  Anemia, Hypertension, Other


Additional Past Medical Histor:  KIDNEY FAILURE,OSTEOARTHRITIS


Past Surgical History:  Hip Replacement, Other


Additional Past Surgical Histo:  back surgery


Alcohol Use:  Occasionally


Drug Use:  None





Adult General


Chief Complaint


Chief Complaint:  OTHER COMPLAINTS





[facility]


HPI





Patient is a 63  year old male who is referred in from the oncology clinic for 

hemoglobin of 4. Patient is a history of anemia he has had dark stools 3 months 

ago he says they're currently brown he was referred in for admission due to 

symptomatically anemia he is dyspnea on exertion.


Apparently according to the oncology notes that he brought with him they're 

planning to do a bone marrow biopsy in the hospital. They're concerned about 

some sort of a bone marrow process. Patient says that he has no chest pain no 

abdominal pain no rectal bleeding over the last couple of days. Symptoms are 

severe and there slowly worsening with time he is generally weak





Review of Systems


Review of Systems





Constitutional: Denies fever or chills []


Eyes: Denies change in visual acuity, redness, or eye pain []


HENT: Denies nasal congestion or sore throat []


Respiratory: Denies cough


Cardiovascular: No additional information not addressed in HPI []


GI: Denies abdominal pain, nausea, vomiting,


Musculoskeletal: Denies back pain or joint pain []


Integument: Denies rash or skin lesions []








All other systems were reviewed and found to be within normal limits, except as 

documented in this note.





Allergies


Allergies





Allergies








Coded Allergies Type Severity Reaction Last Updated Verified


 


  No Known Drug Allergies    9/12/18 No











Physical Exam


Physical Exam





Constitutional: Well developed, well nourished, no acute distress, non-toxic 

appearance. []


HENT: Normocephalic, atraumatic, bilateral external ears normal, oropharynx 

moist, no oral exudates, nose normal. []


Eyes: PERRLA, EOMI, conjunctiva PALE, no discharge. [] 


Neck: Normal range of motion, no tenderness, supple, no stridor. [] 


Cardiovascular:Heart rate regular rhythm, no murmur []


Lungs & Thorax:  Bilateral breath sounds clear to auscultation []


Abdomen: Bowel sounds normal, soft, no tenderness, no masses, no pulsatile 

masses. [] 


Skin: Warm, dry, no erythema, no rash. []  PALE


Back: No tenderness, no CVA tenderness. [] 


Extremities: No tenderness, no cyanosis, no clubbing, ROM intact, no edema. [] 


Neurologic: Alert and oriented X 3, normal motor function, normal sensory 

function, no focal deficits noted. []


Psychologic: Affect normal, judgement normal, mood normal. []





Current Patient Data


Vital Signs





 Vital Signs








  Date Time  Temp Pulse Resp B/P (MAP) Pulse Ox O2 Delivery O2 Flow Rate FiO2


 


10/8/18 12:00 97.7 84 20 118/65 (82) 98 Room Air  





 97.7       








Lab Values





 Laboratory Tests








Test


 10/8/18


12:37


 


White Blood Count


 6.3 x10^3/uL


(4.0-11.0)


 


Red Blood Count


 1.43 x10^6/uL


(4.30-5.70)  L


 


Hemoglobin


 4.4 g/dL


(13.0-17.5)  *L


 


Hematocrit


 12.2 %


(39.0-53.0)  *L


 


Mean Corpuscular Volume


 86 fL ()





 


Mean Corpuscular Hemoglobin 31 pg (25-35)  


 


Mean Corpuscular Hemoglobin


Concent 36 g/dL


(31-37)


 


Red Cell Distribution Width


 15.5 %


(11.5-14.5)  H


 


Platelet Count


 232 x10^3/uL


(140-400)


 


Neutrophils (%) (Auto) 78 % (31-73)  H


 


Lymphocytes (%) (Auto) 12 % (24-48)  L


 


Monocytes (%) (Auto) 9 % (0-9)  


 


Eosinophils (%) (Auto) 0 % (0-3)  


 


Basophils (%) (Auto) 1 % (0-3)  


 


Neutrophils # (Auto)


 4.9 x10^3uL


(1.8-7.7)


 


Lymphocytes # (Auto)


 0.8 x10^3/uL


(1.0-4.8)  L


 


Monocytes # (Auto)


 0.6 x10^3/uL


(0.0-1.1)


 


Eosinophils # (Auto)


 0.0 x10^3/uL


(0.0-0.7)


 


Basophils # (Auto)


 0.0 x10^3/uL


(0.0-0.2)


 


Prothrombin Time


 15.3 SEC


(11.7-14.0)  H


 


Prothrombin Time INR


 1.3 (0.8-1.1)


H


 


Sodium Level


 141 mmol/L


(136-145)


 


Potassium Level


 3.9 mmol/L


(3.5-5.1)


 


Chloride Level


 106 mmol/L


()


 


Carbon Dioxide Level


 26 mmol/L


(21-32)


 


Anion Gap 9 (6-14)  


 


Blood Urea Nitrogen


 18 mg/dL


(8-26)


 


Creatinine


 1.0 mg/dL


(0.7-1.3)


 


Estimated GFR


(Cockcroft-Gault) 91.6  





 


BUN/Creatinine Ratio 18 (6-20)  


 


Glucose Level


 112 mg/dL


(70-99)  H


 


Calcium Level


 8.5 mg/dL


(8.5-10.1)


 


Total Bilirubin


 1.0 mg/dL


(0.2-1.0)


 


Aspartate Amino Transferase


(AST) 17 U/L (15-37)





 


Alanine Aminotransferase (ALT)


 18 U/L (16-63)





 


Alkaline Phosphatase


 98 U/L


()


 


Troponin I Quantitative


 0.051 ng/mL


(0.000-0.055)


 


Total Protein


 6.3 g/dL


(6.4-8.2)  L


 


Albumin


 2.9 g/dL


(3.4-5.0)  L


 


Albumin/Globulin Ratio


 0.9 (1.0-1.7)


L





 Laboratory Tests


10/8/18 12:37








 Laboratory Tests


10/8/18 12:37














EKG


EKG


[]


Interpretation Time:


EKG shows a normal sinus rhythm with a rate of 82 there is low voltage 

intervals are showing a QTC of 482 nonspecific anterior changes no STEMI





Radiology/Procedures


Radiology/Procedures


[]





Course & Med Decision Making


Course & Med Decision Making


Pertinent Labs and Imaging studies reviewed. (See chart for details)





[]63-year-old male presenting with anemia with a hemoglobin in the 4 range. 

Patient did describe dyspnea on exertion no definite chest pain noted troponin 

is borderline however. Patient will be admitted to the service of Dr. Toscano 

for blood transfusions and then oncology evaluation.


Patient denies any rectal bleeding at this time. Apparently he has had some 

dark stool 3 months ago but it stopped after he stopped his iron.





Dragon Disclaimer


Dragon Disclaimer


This electronic medical record was generated, in whole or in part, using a 

voice recognition dictation system.





Departure


Departure


Impression:  


 Primary Impression:  


 Anemia


Disposition:  09 ADMITTED AS INPATIENT


Admitting Physician:  Isai Ruvalcaba


Referrals:  


BOBBY GUERRA MD (PCP)











TOMER FONTENOT MD Oct 8, 2018 13:35

## 2018-10-08 NOTE — EKG
[facility]

              [address] [place] [address]

Test Date:    2018-10-08               Test Time:    12:17:12

Pat Name:     LYRIC MARQUEZ              Department:   

Patient ID:   [id number]           Room:         526 1

Gender:       M                        Technician:   

:          [id number]               Requested By: TOMER FONTENOT

Order Number: [id number]           Reading MD:   Brien Rendon MD

                                 Measurements

Intervals                              Axis          

Rate:         82                       P:            48

ND:           146                      QRS:          -38

QRSD:         88                       T:            54

QT:           410                                    

QTc:          482                                    

                           Interpretive Statements

SINUS RHYTHM

ABNORMAL LEFT AXIS DEVIATION

LOW LIMB LEAD VOLTAGE

NON-SPECIFIC ST/T CHANGES

Electronically Signed On 10- 12:20:23 CDT by Brien Rendon MD

## 2018-10-09 VITALS — DIASTOLIC BLOOD PRESSURE: 66 MMHG | SYSTOLIC BLOOD PRESSURE: 110 MMHG

## 2018-10-09 VITALS — DIASTOLIC BLOOD PRESSURE: 69 MMHG | SYSTOLIC BLOOD PRESSURE: 111 MMHG

## 2018-10-09 VITALS — SYSTOLIC BLOOD PRESSURE: 129 MMHG | DIASTOLIC BLOOD PRESSURE: 75 MMHG

## 2018-10-09 VITALS — DIASTOLIC BLOOD PRESSURE: 69 MMHG | SYSTOLIC BLOOD PRESSURE: 109 MMHG

## 2018-10-09 VITALS — SYSTOLIC BLOOD PRESSURE: 122 MMHG | DIASTOLIC BLOOD PRESSURE: 81 MMHG

## 2018-10-09 VITALS — DIASTOLIC BLOOD PRESSURE: 65 MMHG | SYSTOLIC BLOOD PRESSURE: 107 MMHG

## 2018-10-09 VITALS — DIASTOLIC BLOOD PRESSURE: 66 MMHG | SYSTOLIC BLOOD PRESSURE: 112 MMHG

## 2018-10-09 VITALS — SYSTOLIC BLOOD PRESSURE: 123 MMHG | DIASTOLIC BLOOD PRESSURE: 76 MMHG

## 2018-10-09 VITALS — DIASTOLIC BLOOD PRESSURE: 72 MMHG | SYSTOLIC BLOOD PRESSURE: 118 MMHG

## 2018-10-09 VITALS — DIASTOLIC BLOOD PRESSURE: 71 MMHG | SYSTOLIC BLOOD PRESSURE: 114 MMHG

## 2018-10-09 VITALS — SYSTOLIC BLOOD PRESSURE: 124 MMHG | DIASTOLIC BLOOD PRESSURE: 77 MMHG

## 2018-10-09 VITALS — SYSTOLIC BLOOD PRESSURE: 131 MMHG | DIASTOLIC BLOOD PRESSURE: 74 MMHG

## 2018-10-09 VITALS — SYSTOLIC BLOOD PRESSURE: 111 MMHG | DIASTOLIC BLOOD PRESSURE: 69 MMHG

## 2018-10-09 VITALS — SYSTOLIC BLOOD PRESSURE: 110 MMHG | DIASTOLIC BLOOD PRESSURE: 66 MMHG

## 2018-10-09 VITALS — DIASTOLIC BLOOD PRESSURE: 81 MMHG | SYSTOLIC BLOOD PRESSURE: 133 MMHG

## 2018-10-09 VITALS — DIASTOLIC BLOOD PRESSURE: 75 MMHG | SYSTOLIC BLOOD PRESSURE: 130 MMHG

## 2018-10-09 VITALS — DIASTOLIC BLOOD PRESSURE: 72 MMHG | SYSTOLIC BLOOD PRESSURE: 115 MMHG

## 2018-10-09 VITALS — SYSTOLIC BLOOD PRESSURE: 111 MMHG | DIASTOLIC BLOOD PRESSURE: 68 MMHG

## 2018-10-09 VITALS — DIASTOLIC BLOOD PRESSURE: 68 MMHG | SYSTOLIC BLOOD PRESSURE: 111 MMHG

## 2018-10-09 VITALS — DIASTOLIC BLOOD PRESSURE: 79 MMHG | SYSTOLIC BLOOD PRESSURE: 121 MMHG

## 2018-10-09 LAB
BASOPHILS # BLD AUTO: 0 X10^3/UL (ref 0–0.2)
BASOPHILS NFR BLD: 0 % (ref 0–3)
EOSINOPHIL NFR BLD: 0 % (ref 0–3)
EOSINOPHIL NFR BLD: 0 X10^3/UL (ref 0–0.7)
ERYTHROCYTE [DISTWIDTH] IN BLOOD BY AUTOMATED COUNT: 15.6 % (ref 11.5–14.5)
HCT VFR BLD CALC: 14.5 % (ref 39–53)
HGB BLD-MCNC: 5.3 G/DL (ref 13–17.5)
LYMPHOCYTES # BLD: 1 X10^3/UL (ref 1–4.8)
LYMPHOCYTES NFR BLD AUTO: 11 % (ref 24–48)
MCH RBC QN AUTO: 31 PG (ref 25–35)
MCHC RBC AUTO-ENTMCNC: 37 G/DL (ref 31–37)
MCV RBC AUTO: 86 FL (ref 79–100)
MONO #: 0.6 X10^3/UL (ref 0–1.1)
MONOCYTES NFR BLD: 6 % (ref 0–9)
NEUT #: 7.7 X10^3UL (ref 1.8–7.7)
NEUTROPHILS NFR BLD AUTO: 82 % (ref 31–73)
PLATELET # BLD AUTO: 206 X10^3/UL (ref 140–400)
RBC # BLD AUTO: 1.69 X10^6/UL (ref 4.3–5.7)
WBC # BLD AUTO: 9.4 X10^3/UL (ref 4–11)

## 2018-10-09 NOTE — PDOC2
CONSULT


Date of Consult


Date of Consult


DATE: 10/9/18 


TIME: 09:18


Reason for consultation: Anemia


Consult: Hematology oncology, Dr. Jake Humphrey





History of present illness: He is a 63-year-old male with anemia, acute on 

chronic, severe, it's been present since his hip replacement left with revision 

in July 2018, he had a significant hematoma afterwards that was thought to be 

pooling blood however he continues to have low hemoglobin despite significant 

improvement in the hematoma, associated with dyspnea, not associated with any 

leukopenia or thrombocytopenia, however it's been ongoing since July, worse 

after surgery, and he does have a family history of unknown bone marrow 

disorders and there is a concern for possible red cell aplasia. He's receiving 

his third transfusion for a hemoglobin of about 4.7 yesterday. Up to 5.3 after 

initial transfusion. Dyspnea much better.





Past medical history: Acute renal failure in the past


Anemia


Back pain


Hypertension


Osteoarthritis





Past surgical history: Left hip arthroplasty January 2004


Left hip removal of hardware with revision total hip arthroplasty 10 July 2018


Back surgery 3





Allergies: No known drug allergies





Medications: See attached list





Social history: Quit tobacco in 2004, quit alcohol at 40 years old, quit 

cocaine at 40 years old, he is a , , one daughter





Family history: Unknown bone marrow disorders in his family





Review of systems: He had dyspnea which is improved after transfusion, lower 

extremity edema, left hip pain improving postop, walks with a cane, otherwise 

10 point review of systems is negative





Physical exam: Vitals reviewed


Gen.: Well-nourished and well-developed in no acute distress


HEENT: mucous membranes moist, head normocephalic atraumatic 


Neck: Supple, no lymphadenopathy


Lymph nodes: No palpable lymphadenopathy neck or axilla 


Lungs: Breathing comfortably, no evidence of respiratory distress


Heart: Regular rate and rhythm


Abdomen: Soft, nontender, nondistended


Extremities: No cyanosis, BLE edema


Skin: No obvious rashes or skin breakdown


Neuro: Alert and oriented 3


Psych: pleasant mood and affect





Lab reviewed: White count 9.4, hemoglobin 5.3, platelets 206





Rads reviewed: none this admission





Case discussed with: Patient, student nurse, interventional radiology, and 

records reviewed in [facility] and [facility] including labs and radiology as needed.





Assessment and Plan: He is a 63-year-old male with a history of left hip 

arthroplasty in July, he has had continued anemia afterwards, there was a 

hematoma at the left hip which has improved, though anemia keeps recurring with 

a hemoglobin in the 4.7 range yesterday, on his third transfusion now, concern 

for red cell aplasia.





Anemia: Transfuse to hemoglobin greater than 7





Concern for red cell aplasia: We'll order bone marrow biopsy and parvo titers





Hip pain: Improving postoperatively





Disposition: Potentially after bone marrow biopsy, can follow up as outpatient 

as needed





Thank you kindly for this consultation, and please do not hesitate to call with 

any further questions.





Past Medical History


Cardiovascular:  HTN


Pulmonary:  Bronchitis


GI:  No pertinent hx


Heme/Onc:  No pertinent hx


Hepatobiliary:  No pertinent hx


Musculoskeletal:  Osteoarthritis


Renal/:  No pertinent hx


Endocrine:  No pertinent hx





Past Surgical History


Past Surgical History:  Total hip replacement





Family History


Family History:  No Significant





Social History


ALCOHOL:  none


Drugs:  None


Lives:  with Family





Current Medications


Current Medications





Active Scripts


Active


Reported


No Known Medications Prior To Admisstion (Info)  Each 1 Each 





Allergies


Allergies:  


Coded Allergies:  


     No Known Drug Allergies (Unverified , 9/12/18)





Vitals


VITALS





Vital Signs








  Date Time  Temp Pulse Resp B/P (MAP) Pulse Ox O2 Delivery O2 Flow Rate FiO2


 


10/9/18 09:05 98.4 90 20 111/69 (83) 98 Room Air  





 98.4       











Labs


Labs





Laboratory Tests








Test


 10/8/18


12:37 10/9/18


03:45


 


White Blood Count


 6.3 x10^3/uL


(4.0-11.0) 9.4 x10^3/uL


(4.0-11.0)


 


Red Blood Count


 1.43 x10^6/uL


(4.30-5.70) 1.69 x10^6/uL


(4.30-5.70)


 


Hemoglobin


 4.4 g/dL


(13.0-17.5) 5.3 g/dL


(13.0-17.5)


 


Hematocrit


 12.2 %


(39.0-53.0) 14.5 %


(39.0-53.0)


 


Mean Corpuscular Volume 86 fL ()  86 fL () 


 


Mean Corpuscular Hemoglobin 31 pg (25-35)  31 pg (25-35) 


 


Mean Corpuscular Hemoglobin


Concent 36 g/dL


(31-37) 37 g/dL


(31-37)


 


Red Cell Distribution Width


 15.5 %


(11.5-14.5) 15.6 %


(11.5-14.5)


 


Platelet Count


 232 x10^3/uL


(140-400) 206 x10^3/uL


(140-400)


 


Neutrophils (%) (Auto) 78 % (31-73)  82 % (31-73) 


 


Lymphocytes (%) (Auto) 12 % (24-48)  11 % (24-48) 


 


Monocytes (%) (Auto) 9 % (0-9)  6 % (0-9) 


 


Eosinophils (%) (Auto) 0 % (0-3)  0 % (0-3) 


 


Basophils (%) (Auto) 1 % (0-3)  0 % (0-3) 


 


Neutrophils # (Auto)


 4.9 x10^3uL


(1.8-7.7) 7.7 x10^3uL


(1.8-7.7)


 


Lymphocytes # (Auto)


 0.8 x10^3/uL


(1.0-4.8) 1.0 x10^3/uL


(1.0-4.8)


 


Monocytes # (Auto)


 0.6 x10^3/uL


(0.0-1.1) 0.6 x10^3/uL


(0.0-1.1)


 


Eosinophils # (Auto)


 0.0 x10^3/uL


(0.0-0.7) 0.0 x10^3/uL


(0.0-0.7)


 


Basophils # (Auto)


 0.0 x10^3/uL


(0.0-0.2) 0.0 x10^3/uL


(0.0-0.2)


 


Prothrombin Time


 15.3 SEC


(11.7-14.0) 





 


Prothromb Time International


Ratio 1.3 (0.8-1.1) 


 





 


Sodium Level


 141 mmol/L


(136-145) 





 


Potassium Level


 3.9 mmol/L


(3.5-5.1) 





 


Chloride Level


 106 mmol/L


() 





 


Carbon Dioxide Level


 26 mmol/L


(21-32) 





 


Anion Gap 9 (6-14)  


 


Blood Urea Nitrogen


 18 mg/dL


(8-26) 





 


Creatinine


 1.0 mg/dL


(0.7-1.3) 





 


Estimated GFR


(Cockcroft-Gault) 91.6 


 





 


BUN/Creatinine Ratio 18 (6-20)  


 


Glucose Level


 112 mg/dL


(70-99) 





 


Calcium Level


 8.5 mg/dL


(8.5-10.1) 





 


Total Bilirubin


 1.0 mg/dL


(0.2-1.0) 





 


Aspartate Amino Transf


(AST/SGOT) 17 U/L (15-37) 


 





 


Alanine Aminotransferase


(ALT/SGPT) 18 U/L (16-63) 


 





 


Alkaline Phosphatase


 98 U/L


() 





 


Troponin I Quantitative


 0.051 ng/mL


(0.000-0.055) 





 


Total Protein


 6.3 g/dL


(6.4-8.2) 





 


Albumin


 2.9 g/dL


(3.4-5.0) 





 


Albumin/Globulin Ratio 0.9 (1.0-1.7)  








Laboratory Tests








Test


 10/8/18


12:37 10/9/18


03:45


 


White Blood Count


 6.3 x10^3/uL


(4.0-11.0) 9.4 x10^3/uL


(4.0-11.0)


 


Red Blood Count


 1.43 x10^6/uL


(4.30-5.70) 1.69 x10^6/uL


(4.30-5.70)


 


Hemoglobin


 4.4 g/dL


(13.0-17.5) 5.3 g/dL


(13.0-17.5)


 


Hematocrit


 12.2 %


(39.0-53.0) 14.5 %


(39.0-53.0)


 


Mean Corpuscular Volume 86 fL ()  86 fL () 


 


Mean Corpuscular Hemoglobin 31 pg (25-35)  31 pg (25-35) 


 


Mean Corpuscular Hemoglobin


Concent 36 g/dL


(31-37) 37 g/dL


(31-37)


 


Red Cell Distribution Width


 15.5 %


(11.5-14.5) 15.6 %


(11.5-14.5)


 


Platelet Count


 232 x10^3/uL


(140-400) 206 x10^3/uL


(140-400)


 


Neutrophils (%) (Auto) 78 % (31-73)  82 % (31-73) 


 


Lymphocytes (%) (Auto) 12 % (24-48)  11 % (24-48) 


 


Monocytes (%) (Auto) 9 % (0-9)  6 % (0-9) 


 


Eosinophils (%) (Auto) 0 % (0-3)  0 % (0-3) 


 


Basophils (%) (Auto) 1 % (0-3)  0 % (0-3) 


 


Neutrophils # (Auto)


 4.9 x10^3uL


(1.8-7.7) 7.7 x10^3uL


(1.8-7.7)


 


Lymphocytes # (Auto)


 0.8 x10^3/uL


(1.0-4.8) 1.0 x10^3/uL


(1.0-4.8)


 


Monocytes # (Auto)


 0.6 x10^3/uL


(0.0-1.1) 0.6 x10^3/uL


(0.0-1.1)


 


Eosinophils # (Auto)


 0.0 x10^3/uL


(0.0-0.7) 0.0 x10^3/uL


(0.0-0.7)


 


Basophils # (Auto)


 0.0 x10^3/uL


(0.0-0.2) 0.0 x10^3/uL


(0.0-0.2)


 


Prothrombin Time


 15.3 SEC


(11.7-14.0) 





 


Prothromb Time International


Ratio 1.3 (0.8-1.1) 


 





 


Sodium Level


 141 mmol/L


(136-145) 





 


Potassium Level


 3.9 mmol/L


(3.5-5.1) 





 


Chloride Level


 106 mmol/L


() 





 


Carbon Dioxide Level


 26 mmol/L


(21-32) 





 


Anion Gap 9 (6-14)  


 


Blood Urea Nitrogen


 18 mg/dL


(8-26) 





 


Creatinine


 1.0 mg/dL


(0.7-1.3) 





 


Estimated GFR


(Cockcroft-Gault) 91.6 


 





 


BUN/Creatinine Ratio 18 (6-20)  


 


Glucose Level


 112 mg/dL


(70-99) 





 


Calcium Level


 8.5 mg/dL


(8.5-10.1) 





 


Total Bilirubin


 1.0 mg/dL


(0.2-1.0) 





 


Aspartate Amino Transf


(AST/SGOT) 17 U/L (15-37) 


 





 


Alanine Aminotransferase


(ALT/SGPT) 18 U/L (16-63) 


 





 


Alkaline Phosphatase


 98 U/L


() 





 


Troponin I Quantitative


 0.051 ng/mL


(0.000-0.055) 





 


Total Protein


 6.3 g/dL


(6.4-8.2) 





 


Albumin


 2.9 g/dL


(3.4-5.0) 





 


Albumin/Globulin Ratio 0.9 (1.0-1.7)  

















JAKE HUMPHREY MD Oct 9, 2018 09:24

## 2018-10-09 NOTE — PDOC
PROGRESS NOTES


History of Present Illness


History of Present Illness





ASSESSMENT AND PLAN:  





Severe chronic anemia, suspect he may have a bone marrow process.   SUCH AS 

APLASIA








admitted. 


transfuse 


consult Hematology/Oncology.  


Frequent labs, 


home meds,


 IV fluids,


 PT, OT.


RETIC COUNT


BM BX





Vitals


Vitals





Vital Signs








  Date Time  Temp Pulse Resp B/P (MAP) Pulse Ox O2 Delivery O2 Flow Rate FiO2


 


10/9/18 10:26 98.0 88 16 111/68 (82) 98 Room Air  





 98.0       











Physical Exam


General:  Oriented X3, Cooperative, No acute distress


Heart:  Regular rate, Normal S1


Lungs:  Clear


Abdomen:  Normal bowel sounds, Soft, No hepatosplenomegaly


Extremities:  No cyanosis





Labs


LABS





Laboratory Tests








Test


 10/8/18


12:37 10/9/18


03:45


 


White Blood Count


 6.3 x10^3/uL


(4.0-11.0) 9.4 x10^3/uL


(4.0-11.0)


 


Red Blood Count


 1.43 x10^6/uL


(4.30-5.70) 1.69 x10^6/uL


(4.30-5.70)


 


Hemoglobin


 4.4 g/dL


(13.0-17.5) 5.3 g/dL


(13.0-17.5)


 


Hematocrit


 12.2 %


(39.0-53.0) 14.5 %


(39.0-53.0)


 


Mean Corpuscular Volume 86 fL ()  86 fL () 


 


Mean Corpuscular Hemoglobin 31 pg (25-35)  31 pg (25-35) 


 


Mean Corpuscular Hemoglobin


Concent 36 g/dL


(31-37) 37 g/dL


(31-37)


 


Red Cell Distribution Width


 15.5 %


(11.5-14.5) 15.6 %


(11.5-14.5)


 


Platelet Count


 232 x10^3/uL


(140-400) 206 x10^3/uL


(140-400)


 


Neutrophils (%) (Auto) 78 % (31-73)  82 % (31-73) 


 


Lymphocytes (%) (Auto) 12 % (24-48)  11 % (24-48) 


 


Monocytes (%) (Auto) 9 % (0-9)  6 % (0-9) 


 


Eosinophils (%) (Auto) 0 % (0-3)  0 % (0-3) 


 


Basophils (%) (Auto) 1 % (0-3)  0 % (0-3) 


 


Neutrophils # (Auto)


 4.9 x10^3uL


(1.8-7.7) 7.7 x10^3uL


(1.8-7.7)


 


Lymphocytes # (Auto)


 0.8 x10^3/uL


(1.0-4.8) 1.0 x10^3/uL


(1.0-4.8)


 


Monocytes # (Auto)


 0.6 x10^3/uL


(0.0-1.1) 0.6 x10^3/uL


(0.0-1.1)


 


Eosinophils # (Auto)


 0.0 x10^3/uL


(0.0-0.7) 0.0 x10^3/uL


(0.0-0.7)


 


Basophils # (Auto)


 0.0 x10^3/uL


(0.0-0.2) 0.0 x10^3/uL


(0.0-0.2)


 


Prothrombin Time


 15.3 SEC


(11.7-14.0) 





 


Prothromb Time International


Ratio 1.3 (0.8-1.1) 


 





 


Sodium Level


 141 mmol/L


(136-145) 





 


Potassium Level


 3.9 mmol/L


(3.5-5.1) 





 


Chloride Level


 106 mmol/L


() 





 


Carbon Dioxide Level


 26 mmol/L


(21-32) 





 


Anion Gap 9 (6-14)  


 


Blood Urea Nitrogen


 18 mg/dL


(8-26) 





 


Creatinine


 1.0 mg/dL


(0.7-1.3) 





 


Estimated GFR


(Cockcroft-Gault) 91.6 


 





 


BUN/Creatinine Ratio 18 (6-20)  


 


Glucose Level


 112 mg/dL


(70-99) 





 


Calcium Level


 8.5 mg/dL


(8.5-10.1) 





 


Total Bilirubin


 1.0 mg/dL


(0.2-1.0) 





 


Aspartate Amino Transf


(AST/SGOT) 17 U/L (15-37) 


 





 


Alanine Aminotransferase


(ALT/SGPT) 18 U/L (16-63) 


 





 


Alkaline Phosphatase


 98 U/L


() 





 


Troponin I Quantitative


 0.051 ng/mL


(0.000-0.055) 





 


Total Protein


 6.3 g/dL


(6.4-8.2) 





 


Albumin


 2.9 g/dL


(3.4-5.0) 





 


Albumin/Globulin Ratio 0.9 (1.0-1.7)  











Comment


Review of Relevant


I have reviewed the following items george (where applicable) has been applied.


Labs





Laboratory Tests








Test


 10/8/18


12:37 10/9/18


03:45


 


White Blood Count


 6.3 x10^3/uL


(4.0-11.0) 9.4 x10^3/uL


(4.0-11.0)


 


Red Blood Count


 1.43 x10^6/uL


(4.30-5.70) 1.69 x10^6/uL


(4.30-5.70)


 


Hemoglobin


 4.4 g/dL


(13.0-17.5) 5.3 g/dL


(13.0-17.5)


 


Hematocrit


 12.2 %


(39.0-53.0) 14.5 %


(39.0-53.0)


 


Mean Corpuscular Volume 86 fL ()  86 fL () 


 


Mean Corpuscular Hemoglobin 31 pg (25-35)  31 pg (25-35) 


 


Mean Corpuscular Hemoglobin


Concent 36 g/dL


(31-37) 37 g/dL


(31-37)


 


Red Cell Distribution Width


 15.5 %


(11.5-14.5) 15.6 %


(11.5-14.5)


 


Platelet Count


 232 x10^3/uL


(140-400) 206 x10^3/uL


(140-400)


 


Neutrophils (%) (Auto) 78 % (31-73)  82 % (31-73) 


 


Lymphocytes (%) (Auto) 12 % (24-48)  11 % (24-48) 


 


Monocytes (%) (Auto) 9 % (0-9)  6 % (0-9) 


 


Eosinophils (%) (Auto) 0 % (0-3)  0 % (0-3) 


 


Basophils (%) (Auto) 1 % (0-3)  0 % (0-3) 


 


Neutrophils # (Auto)


 4.9 x10^3uL


(1.8-7.7) 7.7 x10^3uL


(1.8-7.7)


 


Lymphocytes # (Auto)


 0.8 x10^3/uL


(1.0-4.8) 1.0 x10^3/uL


(1.0-4.8)


 


Monocytes # (Auto)


 0.6 x10^3/uL


(0.0-1.1) 0.6 x10^3/uL


(0.0-1.1)


 


Eosinophils # (Auto)


 0.0 x10^3/uL


(0.0-0.7) 0.0 x10^3/uL


(0.0-0.7)


 


Basophils # (Auto)


 0.0 x10^3/uL


(0.0-0.2) 0.0 x10^3/uL


(0.0-0.2)


 


Prothrombin Time


 15.3 SEC


(11.7-14.0) 





 


Prothromb Time International


Ratio 1.3 (0.8-1.1) 


 





 


Sodium Level


 141 mmol/L


(136-145) 





 


Potassium Level


 3.9 mmol/L


(3.5-5.1) 





 


Chloride Level


 106 mmol/L


() 





 


Carbon Dioxide Level


 26 mmol/L


(21-32) 





 


Anion Gap 9 (6-14)  


 


Blood Urea Nitrogen


 18 mg/dL


(8-26) 





 


Creatinine


 1.0 mg/dL


(0.7-1.3) 





 


Estimated GFR


(Cockcroft-Gault) 91.6 


 





 


BUN/Creatinine Ratio 18 (6-20)  


 


Glucose Level


 112 mg/dL


(70-99) 





 


Calcium Level


 8.5 mg/dL


(8.5-10.1) 





 


Total Bilirubin


 1.0 mg/dL


(0.2-1.0) 





 


Aspartate Amino Transf


(AST/SGOT) 17 U/L (15-37) 


 





 


Alanine Aminotransferase


(ALT/SGPT) 18 U/L (16-63) 


 





 


Alkaline Phosphatase


 98 U/L


() 





 


Troponin I Quantitative


 0.051 ng/mL


(0.000-0.055) 





 


Total Protein


 6.3 g/dL


(6.4-8.2) 





 


Albumin


 2.9 g/dL


(3.4-5.0) 





 


Albumin/Globulin Ratio 0.9 (1.0-1.7)  








Laboratory Tests








Test


 10/8/18


12:37 10/9/18


03:45


 


White Blood Count


 6.3 x10^3/uL


(4.0-11.0) 9.4 x10^3/uL


(4.0-11.0)


 


Red Blood Count


 1.43 x10^6/uL


(4.30-5.70) 1.69 x10^6/uL


(4.30-5.70)


 


Hemoglobin


 4.4 g/dL


(13.0-17.5) 5.3 g/dL


(13.0-17.5)


 


Hematocrit


 12.2 %


(39.0-53.0) 14.5 %


(39.0-53.0)


 


Mean Corpuscular Volume 86 fL ()  86 fL () 


 


Mean Corpuscular Hemoglobin 31 pg (25-35)  31 pg (25-35) 


 


Mean Corpuscular Hemoglobin


Concent 36 g/dL


(31-37) 37 g/dL


(31-37)


 


Red Cell Distribution Width


 15.5 %


(11.5-14.5) 15.6 %


(11.5-14.5)


 


Platelet Count


 232 x10^3/uL


(140-400) 206 x10^3/uL


(140-400)


 


Neutrophils (%) (Auto) 78 % (31-73)  82 % (31-73) 


 


Lymphocytes (%) (Auto) 12 % (24-48)  11 % (24-48) 


 


Monocytes (%) (Auto) 9 % (0-9)  6 % (0-9) 


 


Eosinophils (%) (Auto) 0 % (0-3)  0 % (0-3) 


 


Basophils (%) (Auto) 1 % (0-3)  0 % (0-3) 


 


Neutrophils # (Auto)


 4.9 x10^3uL


(1.8-7.7) 7.7 x10^3uL


(1.8-7.7)


 


Lymphocytes # (Auto)


 0.8 x10^3/uL


(1.0-4.8) 1.0 x10^3/uL


(1.0-4.8)


 


Monocytes # (Auto)


 0.6 x10^3/uL


(0.0-1.1) 0.6 x10^3/uL


(0.0-1.1)


 


Eosinophils # (Auto)


 0.0 x10^3/uL


(0.0-0.7) 0.0 x10^3/uL


(0.0-0.7)


 


Basophils # (Auto)


 0.0 x10^3/uL


(0.0-0.2) 0.0 x10^3/uL


(0.0-0.2)


 


Prothrombin Time


 15.3 SEC


(11.7-14.0) 





 


Prothromb Time International


Ratio 1.3 (0.8-1.1) 


 





 


Sodium Level


 141 mmol/L


(136-145) 





 


Potassium Level


 3.9 mmol/L


(3.5-5.1) 





 


Chloride Level


 106 mmol/L


() 





 


Carbon Dioxide Level


 26 mmol/L


(21-32) 





 


Anion Gap 9 (6-14)  


 


Blood Urea Nitrogen


 18 mg/dL


(8-26) 





 


Creatinine


 1.0 mg/dL


(0.7-1.3) 





 


Estimated GFR


(Cockcroft-Gault) 91.6 


 





 


BUN/Creatinine Ratio 18 (6-20)  


 


Glucose Level


 112 mg/dL


(70-99) 





 


Calcium Level


 8.5 mg/dL


(8.5-10.1) 





 


Total Bilirubin


 1.0 mg/dL


(0.2-1.0) 





 


Aspartate Amino Transf


(AST/SGOT) 17 U/L (15-37) 


 





 


Alanine Aminotransferase


(ALT/SGPT) 18 U/L (16-63) 


 





 


Alkaline Phosphatase


 98 U/L


() 





 


Troponin I Quantitative


 0.051 ng/mL


(0.000-0.055) 





 


Total Protein


 6.3 g/dL


(6.4-8.2) 





 


Albumin


 2.9 g/dL


(3.4-5.0) 





 


Albumin/Globulin Ratio 0.9 (1.0-1.7)  








Medications





Active Scripts


Active


Reported


No Known Medications Prior To Admisstion (Info)  Each 1 Each 


Vitals/I & O





Vital Sign - Last 24 Hours








 10/8/18 10/8/18 10/8/18 10/8/18





 12:00 12:36 13:02 13:32


 


Temp 97.7   





 97.7   


 


Pulse 84 84 80 


 


Resp 20 15 11 13


 


B/P (MAP) 118/65 (82) 117/73 (88) 115/70 (85) 118/65 (82)


 


Pulse Ox 98 98 93 93


 


O2 Delivery Room Air Room Air Room Air Room Air


 


    





    





 10/8/18 10/8/18 10/8/18 10/8/18





 13:45 14:10 15:06 15:15


 


Temp  97.5 97.9 





  97.5 97.9 


 


Pulse 84 83 83 


 


Resp 13 18 20 


 


B/P (MAP) 128/66 (86) 118/68 (85) 118/68 


 


Pulse Ox 96 100  


 


O2 Delivery Room Air Room Air  Room Air


 


    





    





 10/8/18 10/8/18 10/8/18 10/8/18





 15:26 16:10 17:10 17:55


 


Temp 97.8 97.9 97.8 97.9





 97.8 97.9 97.8 97.9


 


Pulse 83 83 84 85


 


Resp 20 20 20 20


 


B/P (MAP) 109/69 113/71 113/73 109/68


 


    





    





 10/8/18 10/8/18 10/8/18 10/8/18





 18:55 19:26 19:30 19:40


 


Temp 98.1 97.8  98.0





 98.1 97.8  98.0


 


Pulse 87 85  84


 


Resp 20 16  16


 


B/P (MAP) 107/70 (82) 119/71  120/71


 


Pulse Ox 98   


 


O2 Delivery Room Air  Room Air 


 


    





    





 10/8/18 10/8/18 10/8/18 10/9/18





 20:40 21:43 22:46 03:12


 


Temp 98.4 98.0 97.9 98.5





 98.4 98.0 97.9 98.5


 


Pulse 85 85 93 89


 


Resp 14 16 15 20


 


B/P (MAP) 125/77 121/75 138/75 109/69 (82)


 


Pulse Ox    98


 


O2 Delivery    Room Air


 


    





    





 10/9/18 10/9/18 10/9/18 10/9/18





 07:00 08:00 08:30 08:52


 


Temp 98.1  98.4 98.5





 98.1  98.4 98.5


 


Pulse 91  90 90


 


Resp 17  20 20


 


B/P (MAP) 118/72 (87)  111/69 107/65


 


Pulse Ox 96   


 


O2 Delivery Room Air Room Air  


 


    





    





 10/9/18 10/9/18 10/9/18 10/9/18





 09:05 09:12 09:32 10:25


 


Temp 98.4  98.4 98.0





 98.4  98.4 98.0


 


Pulse 90  88 88


 


Resp 20  20 16


 


B/P (MAP) 111/69 (83)  112/66 111/68


 


Pulse Ox 98   


 


O2 Delivery Room Air Room Air  


 


    





    





 10/9/18   





 10:26   


 


Temp 98.0   





 98.0   


 


Pulse 88   


 


Resp 16   


 


B/P (MAP) 111/68 (82)   


 


Pulse Ox 98   


 


O2 Delivery Room Air   














Intake and Output   


 


 10/8/18 10/8/18 10/9/18





 15:00 23:00 07:00


 


Intake Total  1173 ml 


 


Balance  1173 ml 

















TERRENCE RICARDO MD Oct 9, 2018 10:47

## 2018-10-10 VITALS — SYSTOLIC BLOOD PRESSURE: 128 MMHG | DIASTOLIC BLOOD PRESSURE: 82 MMHG

## 2018-10-10 VITALS — SYSTOLIC BLOOD PRESSURE: 107 MMHG | DIASTOLIC BLOOD PRESSURE: 60 MMHG

## 2018-10-10 VITALS — DIASTOLIC BLOOD PRESSURE: 61 MMHG | SYSTOLIC BLOOD PRESSURE: 111 MMHG

## 2018-10-10 VITALS — SYSTOLIC BLOOD PRESSURE: 107 MMHG | DIASTOLIC BLOOD PRESSURE: 65 MMHG

## 2018-10-10 VITALS — SYSTOLIC BLOOD PRESSURE: 107 MMHG | DIASTOLIC BLOOD PRESSURE: 61 MMHG

## 2018-10-10 VITALS — SYSTOLIC BLOOD PRESSURE: 109 MMHG | DIASTOLIC BLOOD PRESSURE: 67 MMHG

## 2018-10-10 VITALS — SYSTOLIC BLOOD PRESSURE: 126 MMHG | DIASTOLIC BLOOD PRESSURE: 76 MMHG

## 2018-10-10 VITALS — SYSTOLIC BLOOD PRESSURE: 113 MMHG | DIASTOLIC BLOOD PRESSURE: 71 MMHG

## 2018-10-10 VITALS — SYSTOLIC BLOOD PRESSURE: 98 MMHG | DIASTOLIC BLOOD PRESSURE: 62 MMHG

## 2018-10-10 VITALS — SYSTOLIC BLOOD PRESSURE: 114 MMHG | DIASTOLIC BLOOD PRESSURE: 66 MMHG

## 2018-10-10 VITALS — SYSTOLIC BLOOD PRESSURE: 132 MMHG | DIASTOLIC BLOOD PRESSURE: 62 MMHG

## 2018-10-10 VITALS — SYSTOLIC BLOOD PRESSURE: 111 MMHG | DIASTOLIC BLOOD PRESSURE: 61 MMHG

## 2018-10-10 VITALS — SYSTOLIC BLOOD PRESSURE: 117 MMHG | DIASTOLIC BLOOD PRESSURE: 74 MMHG

## 2018-10-10 VITALS — DIASTOLIC BLOOD PRESSURE: 67 MMHG | SYSTOLIC BLOOD PRESSURE: 111 MMHG

## 2018-10-10 VITALS — SYSTOLIC BLOOD PRESSURE: 107 MMHG | DIASTOLIC BLOOD PRESSURE: 62 MMHG

## 2018-10-10 VITALS — DIASTOLIC BLOOD PRESSURE: 71 MMHG | SYSTOLIC BLOOD PRESSURE: 113 MMHG

## 2018-10-10 VITALS — DIASTOLIC BLOOD PRESSURE: 75 MMHG | SYSTOLIC BLOOD PRESSURE: 125 MMHG

## 2018-10-10 VITALS — SYSTOLIC BLOOD PRESSURE: 122 MMHG | DIASTOLIC BLOOD PRESSURE: 74 MMHG

## 2018-10-10 VITALS — DIASTOLIC BLOOD PRESSURE: 61 MMHG | SYSTOLIC BLOOD PRESSURE: 107 MMHG

## 2018-10-10 LAB
ERYTHROCYTE [DISTWIDTH] IN BLOOD BY AUTOMATED COUNT: 15.7 % (ref 11.5–14.5)
HCT VFR BLD CALC: 20.5 % (ref 39–53)
HCT VFR BLD CALC: 24.6 % (ref 39–53)
HGB BLD-MCNC: 7.2 G/DL (ref 13–17.5)
HGB BLD-MCNC: 8.7 G/DL (ref 13–17.5)
MCH RBC QN AUTO: 30 PG (ref 25–35)
MCHC RBC AUTO-ENTMCNC: 35 G/DL (ref 31–37)
MCHC RBC AUTO-ENTMCNC: 35 G/DL (ref 31–37)
MCV RBC AUTO: 86 FL (ref 79–100)
PLATELET # BLD AUTO: 221 X10^3/UL (ref 140–400)
RBC # BLD AUTO: 2.4 X10^6/UL (ref 4.3–5.7)
WBC # BLD AUTO: 6.4 X10^3/UL (ref 4–11)

## 2018-10-10 PROCEDURE — 07DR3ZX EXTRACTION OF ILIAC BONE MARROW, PERCUTANEOUS APPROACH, DIAGNOSTIC: ICD-10-PCS | Performed by: FAMILY MEDICINE

## 2018-10-10 NOTE — RAD
Chest PA and lateral:

 

Reason for examination: Anemia. History of hypertension and asthma.

 

The heart size is normal. Mediastinum is unremarkable. Lung fields are 

clear. No acute bony abnormalities are seen.

 

Impression:

 

No acute cardiopulmonary disease.

 

Electronically signed by: Goldie Chavez MD (10/10/2018 2:36 AM) [facility] [place]

## 2018-10-10 NOTE — PDOC
MODERATE SEDATION ASSESSMENT


RISKS/ALTERNATIVES


Risks/Alternatives


Risks and alternatives of this type of sedation and procedure discussed with:


RISK/ALTERNATIVES:  Patient





H & P ON CHART


H & P


H & P on chart and reviewed for co-morbid conditions and appropriate labs.


H&P ON CHART:  Yes





PREGNANCY STATUS


PREG STATUS ASSESSED:  Yes





MEDS/ALLERGIES REVIEWED


Meds/Allergies Reviewed


Medications and Allergies including time and route of recently administered 

narcotics and sedatives.


MEDS/ALLERGIES REVIEWED:  Yes





ASA RATING


ASA RATING:  II





AIRWAY ASSESSMENT


Airway Assessment


Airway patency, oral function limitations, presence  of caps, crowns, dentures, 

partials, and ability to extend neck assessed.


AIRWAY ASSESSMENT:  Yes





MALLAMPATI SCORE


MALLAMPATI SCORE:  II





PRE-SEDATION ASSESSMENT


PRE-SEDATION ASSESSMENT:  Yes











EDMOND ESTES MD Oct 10, 2018 09:09

## 2018-10-10 NOTE — PDOC
PROGRESS NOTES


History of Present Illness


History of Present Illness





ASSESSMENT AND PLAN:  





Severe chronic anemia, suspect he may have a bone marrow process.   SUCH AS 

APLASIA








admitted. 


transfuse 


consult Hematology/Oncology.  


Frequent labs, 


home meds,


 IV fluids,


 PT, OT.


RETIC COUNT


BM BX TODAY





Vitals


Vitals





Vital Signs








  Date Time  Temp Pulse Resp B/P (MAP) Pulse Ox O2 Delivery O2 Flow Rate FiO2


 


10/10/18 02:53 100.4 106 18 128/82 (97) 96 Room Air  





 100.4       











Physical Exam


General:  Oriented X3, Cooperative, No acute distress


Heart:  Regular rate, Normal S1


Lungs:  Clear


Abdomen:  Normal bowel sounds, Soft, No hepatosplenomegaly


Extremities:  No clubbing, No cyanosis





Labs


LABS





Laboratory Tests








Test


 10/10/18


04:48


 


White Blood Count


 6.4 x10^3/uL


(4.0-11.0)


 


Red Blood Count


 2.40 x10^6/uL


(4.30-5.70)


 


Hemoglobin


 7.2 g/dL


(13.0-17.5)


 


Hematocrit


 20.5 %


(39.0-53.0)


 


Mean Corpuscular Volume 86 fL () 


 


Mean Corpuscular Hemoglobin 30 pg (25-35) 


 


Mean Corpuscular Hemoglobin


Concent 35 g/dL


(31-37)


 


Red Cell Distribution Width


 15.7 %


(11.5-14.5)


 


Platelet Count


 221 x10^3/uL


(140-400)











Comment


Review of Relevant


I have reviewed the following items george (where applicable) has been applied.


Labs





Laboratory Tests








Test


 10/8/18


12:37 10/9/18


03:40 10/9/18


03:45 10/10/18


04:48


 


White Blood Count


 6.3 x10^3/uL


(4.0-11.0) 


 9.4 x10^3/uL


(4.0-11.0) 6.4 x10^3/uL


(4.0-11.0)


 


Red Blood Count


 1.43 x10^6/uL


(4.30-5.70) 


 1.69 x10^6/uL


(4.30-5.70) 2.40 x10^6/uL


(4.30-5.70)


 


Hemoglobin


 4.4 g/dL


(13.0-17.5) 


 5.3 g/dL


(13.0-17.5) 7.2 g/dL


(13.0-17.5)


 


Hematocrit


 12.2 %


(39.0-53.0) 


 14.5 %


(39.0-53.0) 20.5 %


(39.0-53.0)


 


Mean Corpuscular Volume 86 fL ()   86 fL ()  86 fL () 


 


Mean Corpuscular Hemoglobin 31 pg (25-35)   31 pg (25-35)  30 pg (25-35) 


 


Mean Corpuscular Hemoglobin


Concent 36 g/dL


(31-37) 


 37 g/dL


(31-37) 35 g/dL


(31-37)


 


Red Cell Distribution Width


 15.5 %


(11.5-14.5) 


 15.6 %


(11.5-14.5) 15.7 %


(11.5-14.5)


 


Platelet Count


 232 x10^3/uL


(140-400) 


 206 x10^3/uL


(140-400) 221 x10^3/uL


(140-400)


 


Neutrophils (%) (Auto) 78 % (31-73)   82 % (31-73)  


 


Lymphocytes (%) (Auto) 12 % (24-48)   11 % (24-48)  


 


Monocytes (%) (Auto) 9 % (0-9)   6 % (0-9)  


 


Eosinophils (%) (Auto) 0 % (0-3)   0 % (0-3)  


 


Basophils (%) (Auto) 1 % (0-3)   0 % (0-3)  


 


Neutrophils # (Auto)


 4.9 x10^3uL


(1.8-7.7) 


 7.7 x10^3uL


(1.8-7.7) 





 


Lymphocytes # (Auto)


 0.8 x10^3/uL


(1.0-4.8) 


 1.0 x10^3/uL


(1.0-4.8) 





 


Monocytes # (Auto)


 0.6 x10^3/uL


(0.0-1.1) 


 0.6 x10^3/uL


(0.0-1.1) 





 


Eosinophils # (Auto)


 0.0 x10^3/uL


(0.0-0.7) 


 0.0 x10^3/uL


(0.0-0.7) 





 


Basophils # (Auto)


 0.0 x10^3/uL


(0.0-0.2) 


 0.0 x10^3/uL


(0.0-0.2) 





 


Prothrombin Time


 15.3 SEC


(11.7-14.0) 


 


 





 


Prothromb Time International


Ratio 1.3 (0.8-1.1) 


 


 


 





 


Sodium Level


 141 mmol/L


(136-145) 


 


 





 


Potassium Level


 3.9 mmol/L


(3.5-5.1) 


 


 





 


Chloride Level


 106 mmol/L


() 


 


 





 


Carbon Dioxide Level


 26 mmol/L


(21-32) 


 


 





 


Anion Gap 9 (6-14)    


 


Blood Urea Nitrogen


 18 mg/dL


(8-26) 


 


 





 


Creatinine


 1.0 mg/dL


(0.7-1.3) 


 


 





 


Estimated GFR


(Cockcroft-Gault) 91.6 


 


 


 





 


BUN/Creatinine Ratio 18 (6-20)    


 


Glucose Level


 112 mg/dL


(70-99) 


 


 





 


Calcium Level


 8.5 mg/dL


(8.5-10.1) 


 


 





 


Total Bilirubin


 1.0 mg/dL


(0.2-1.0) 


 


 





 


Aspartate Amino Transf


(AST/SGOT) 17 U/L (15-37) 


 


 


 





 


Alanine Aminotransferase


(ALT/SGPT) 18 U/L (16-63) 


 


 


 





 


Alkaline Phosphatase


 98 U/L


() 


 


 





 


Troponin I Quantitative


 0.051 ng/mL


(0.000-0.055) 


 


 





 


Total Protein


 6.3 g/dL


(6.4-8.2) 


 


 





 


Albumin


 2.9 g/dL


(3.4-5.0) 


 


 





 


Albumin/Globulin Ratio 0.9 (1.0-1.7)    


 


Reticulocyte Count (auto)


 


 1.0 %


(0.5-2.5) 


 











Laboratory Tests








Test


 10/10/18


04:48


 


White Blood Count


 6.4 x10^3/uL


(4.0-11.0)


 


Red Blood Count


 2.40 x10^6/uL


(4.30-5.70)


 


Hemoglobin


 7.2 g/dL


(13.0-17.5)


 


Hematocrit


 20.5 %


(39.0-53.0)


 


Mean Corpuscular Volume 86 fL () 


 


Mean Corpuscular Hemoglobin 30 pg (25-35) 


 


Mean Corpuscular Hemoglobin


Concent 35 g/dL


(31-37)


 


Red Cell Distribution Width


 15.7 %


(11.5-14.5)


 


Platelet Count


 221 x10^3/uL


(140-400)








Medications





Current Medications


Acetaminophen (Tylenol) 650 mg PRN Q6HRS  PRN PO MILD PAIN / TEMP Last 

administered on 10/10/18at 08:33;  Start 10/10/18 at 08:15





Active Scripts


Active


Reported


No Known Medications Prior To Admisstion (Info)  Each 1 Each 


Vitals/I & O





Vital Sign - Last 24 Hours








 10/9/18 10/9/18 10/9/18 10/9/18





 08:52 09:05 09:12 09:32


 


Temp 98.5 98.4  98.4





 98.5 98.4  98.4


 


Pulse 90 90  88


 


Resp 20 20  20


 


B/P (MAP) 107/65 111/69 (83)  112/66


 


Pulse Ox  98  


 


O2 Delivery  Room Air Room Air 


 


    





    





 10/9/18 10/9/18 10/9/18 10/9/18





 10:25 10:26 11:26 11:34


 


Temp 98.0 98.0 98.2 98.0





 98.0 98.0 98.2 98.0


 


Pulse 88 88 87 85


 


Resp 16 16 20 20


 


B/P (MAP) 111/68 111/68 (82) 114/71 115/72


 


Pulse Ox  98  


 


O2 Delivery  Room Air  


 


    





    





 10/9/18 10/9/18 10/9/18 10/9/18





 12:35 14:18 14:34 14:35


 


Temp 98.3 98.4 98.7 98.7





 98.3 98.4 98.7 98.7


 


Pulse 88 93 91 91


 


Resp 20 20 20 20


 


B/P (MAP) 121/79 (93) 131/74 110/66 110/66 (81)


 


Pulse Ox 99   96


 


O2 Delivery Room Air   Room Air


 


    





    





 10/9/18 10/9/18 10/9/18 10/9/18





 15:20 16:20 17:12 18:14


 


Temp 98.7 98.6 99.9 99.2





 98.7 98.6 99.9 99.2


 


Pulse 94 92 93 98


 


Resp 20 20 20 20


 


B/P (MAP) 130/75 123/76 129/75 133/81 (98)


 


Pulse Ox    99


 


O2 Delivery    Room Air


 


    





    





 10/9/18 10/9/18 10/9/18 10/10/18





 19:00 19:55 23:00 02:53


 


Temp 99.2  99.7 100.4





 99.2  99.7 100.4


 


Pulse 99  102 106


 


Resp 18 18 18


 


B/P (MAP) 124/77 (93)  122/81 (95) 128/82 (97)


 


Pulse Ox 97  95 96


 


O2 Delivery Room Air Room Air Room Air Room Air














Intake and Output   


 


 10/9/18 10/9/18 10/10/18





 15:00 23:00 07:00


 


Intake Total 1191 ml 1330 ml 380 ml


 


Output Total 175 ml  300 ml


 


Balance 1016 ml 1330 ml 80 ml

















TERRENCE RICARDO MD Oct 10, 2018 08:41

## 2018-10-10 NOTE — PDOC3
Discharge Summary


Date of Admission:  Oct 8, 2018


Date of Discharge:  Oct 10, 2018


Follow-Up:  3-5 days


Admitting Diagnosis comment:


discharge diagnosis===============





History of Present Illness





ASSESSMENT AND PLAN:  





Severe chronic anemia, suspect  bone marrow process.   SUCH AS APLASIA








admitted. 


transfuse 


consult Hematology/Oncology.  ok with d/c today


home meds,





 PT, OT.


RETIC COUNT


BM BX TODAY





Vitals


Vitals





Vital Signs








  Date Time  Temp Pulse Resp B/P (MAP) Pulse Ox O2 Delivery O2 Flow Rate FiO2


 


10/10/18 02:53 100.4 106 18 128/82 (97) 96 Room Air  





 100.4       











Physical Exam


General:  Oriented X3, Cooperative, No acute distress


Heart:  Regular rate, Normal S1


Lungs:  Clear


Abdomen:  Normal bowel sounds, Soft, No hepatosplenomegaly


Extremities:  No clubbing, No cyanosis


Brief Hospital Course


Mr. Louis  is a 63 old [sex] who presented with [anemia ]


CONDITION AT DISCHARGE:  Improved


Discharge Medications





Current Medications


Acetaminophen (Tylenol) 650 mg PRN Q6HRS  PRN PO MILD PAIN / TEMP Last 

administered on 10/10/18at 08:33;  Start 10/10/18 at 08:15


Midazolam HCl (Versed) 2 mg STK-MED ONCE .ROUTE ;  Start 10/10/18 at 09:01;  

Stop 10/10/18 at 09:02;  Status DC


Fentanyl Citrate (Fentanyl 2ml Vial) 100 mcg STK-MED ONCE .ROUTE ;  Start 10/10/

18 at 09:01;  Stop 10/10/18 at 09:02;  Status DC


Lidocaine/Sodium Bicarbonate (Buffered Lidocaine 1%) 3 ml 1X  ONCE IJ  Last 

administered on 10/10/18at 09:24;  Start 10/10/18 at 09:15;  Stop 10/10/18 at 09

:24;  Status DC


Midazolam HCl (Versed) 2 mg 1X  ONCE IV  Last administered on 10/10/18at 09:25;

  Start 10/10/18 at 09:15;  Stop 10/10/18 at 09:24;  Status DC


Fentanyl Citrate (Fentanyl 2ml Vial) 100 mcg 1X  ONCE IV  Last administered on 

10/10/18at 09:15;  Start 10/10/18 at 09:15;  Stop 10/10/18 at 09:24;  Status DC





Active Scripts


Active


Reported


No Known Medications Prior To Admisstion (Info)  Each 1 Each 


Vital Signs





Vital Signs








  Date Time  Temp Pulse Resp B/P (MAP) Pulse Ox O2 Delivery O2 Flow Rate FiO2


 


10/10/18 11:45 97.9 88 14 107/65    





 97.9       


 


10/10/18 09:25     97 Nasal Cannula 2.0 








Labs





Laboratory Tests








Test


 10/8/18


12:37 10/9/18


03:40 10/9/18


03:45 10/10/18


04:48


 


White Blood Count


 6.3 x10^3/uL


(4.0-11.0) 


 9.4 x10^3/uL


(4.0-11.0) 6.4 x10^3/uL


(4.0-11.0)


 


Red Blood Count


 1.43 x10^6/uL


(4.30-5.70) 


 1.69 x10^6/uL


(4.30-5.70) 2.40 x10^6/uL


(4.30-5.70)


 


Hemoglobin


 4.4 g/dL


(13.0-17.5) 


 5.3 g/dL


(13.0-17.5) 7.2 g/dL


(13.0-17.5)


 


Hematocrit


 12.2 %


(39.0-53.0) 


 14.5 %


(39.0-53.0) 20.5 %


(39.0-53.0)


 


Mean Corpuscular Volume 86 fL ()   86 fL ()  86 fL () 


 


Mean Corpuscular Hemoglobin 31 pg (25-35)   31 pg (25-35)  30 pg (25-35) 


 


Mean Corpuscular Hemoglobin


Concent 36 g/dL


(31-37) 


 37 g/dL


(31-37) 35 g/dL


(31-37)


 


Red Cell Distribution Width


 15.5 %


(11.5-14.5) 


 15.6 %


(11.5-14.5) 15.7 %


(11.5-14.5)


 


Platelet Count


 232 x10^3/uL


(140-400) 


 206 x10^3/uL


(140-400) 221 x10^3/uL


(140-400)


 


Neutrophils (%) (Auto) 78 % (31-73)   82 % (31-73)  


 


Lymphocytes (%) (Auto) 12 % (24-48)   11 % (24-48)  


 


Monocytes (%) (Auto) 9 % (0-9)   6 % (0-9)  


 


Eosinophils (%) (Auto) 0 % (0-3)   0 % (0-3)  


 


Basophils (%) (Auto) 1 % (0-3)   0 % (0-3)  


 


Neutrophils # (Auto)


 4.9 x10^3uL


(1.8-7.7) 


 7.7 x10^3uL


(1.8-7.7) 





 


Lymphocytes # (Auto)


 0.8 x10^3/uL


(1.0-4.8) 


 1.0 x10^3/uL


(1.0-4.8) 





 


Monocytes # (Auto)


 0.6 x10^3/uL


(0.0-1.1) 


 0.6 x10^3/uL


(0.0-1.1) 





 


Eosinophils # (Auto)


 0.0 x10^3/uL


(0.0-0.7) 


 0.0 x10^3/uL


(0.0-0.7) 





 


Basophils # (Auto)


 0.0 x10^3/uL


(0.0-0.2) 


 0.0 x10^3/uL


(0.0-0.2) 





 


Prothrombin Time


 15.3 SEC


(11.7-14.0) 


 


 





 


Prothromb Time International


Ratio 1.3 (0.8-1.1) 


 


 


 





 


Sodium Level


 141 mmol/L


(136-145) 


 


 





 


Potassium Level


 3.9 mmol/L


(3.5-5.1) 


 


 





 


Chloride Level


 106 mmol/L


() 


 


 





 


Carbon Dioxide Level


 26 mmol/L


(21-32) 


 


 





 


Anion Gap 9 (6-14)    


 


Blood Urea Nitrogen


 18 mg/dL


(8-26) 


 


 





 


Creatinine


 1.0 mg/dL


(0.7-1.3) 


 


 





 


Estimated GFR


(Cockcroft-Gault) 91.6 


 


 


 





 


BUN/Creatinine Ratio 18 (6-20)    


 


Glucose Level


 112 mg/dL


(70-99) 


 


 





 


Calcium Level


 8.5 mg/dL


(8.5-10.1) 


 


 





 


Total Bilirubin


 1.0 mg/dL


(0.2-1.0) 


 


 





 


Aspartate Amino Transf


(AST/SGOT) 17 U/L (15-37) 


 


 


 





 


Alanine Aminotransferase


(ALT/SGPT) 18 U/L (16-63) 


 


 


 





 


Alkaline Phosphatase


 98 U/L


() 


 


 





 


Troponin I Quantitative


 0.051 ng/mL


(0.000-0.055) 


 


 





 


Total Protein


 6.3 g/dL


(6.4-8.2) 


 


 





 


Albumin


 2.9 g/dL


(3.4-5.0) 


 


 





 


Albumin/Globulin Ratio 0.9 (1.0-1.7)    


 


Reticulocyte Count (auto)


 


 1.0 %


(0.5-2.5) 


 











Laboratory Tests








Test


 10/10/18


04:48


 


White Blood Count


 6.4 x10^3/uL


(4.0-11.0)


 


Red Blood Count


 2.40 x10^6/uL


(4.30-5.70)


 


Hemoglobin


 7.2 g/dL


(13.0-17.5)


 


Hematocrit


 20.5 %


(39.0-53.0)


 


Mean Corpuscular Volume 86 fL () 


 


Mean Corpuscular Hemoglobin 30 pg (25-35) 


 


Mean Corpuscular Hemoglobin


Concent 35 g/dL


(31-37)


 


Red Cell Distribution Width


 15.7 %


(11.5-14.5)


 


Platelet Count


 221 x10^3/uL


(140-400)








Allergies





 Allergies








Coded Allergies Type Severity Reaction Last Updated Verified


 


  No Known Drug Allergies    9/12/18 No








Disposition/Orders:  D/C to Home


Patient Instructions


d/c planning 33 min











TERRENCE RICARDO MD Oct 10, 2018 12:38

## 2018-10-10 NOTE — PDOC
SUBJECTIVE


Subjective


S: getting BMBx





O: 


Physical exam: Deferred as he is currently at interventional radiology





Labs: Hemoglobin up to 7.2 after 4 unit transfusion





Assessment and Plan: He is a 63-year-old male with a history of left hip 

arthroplasty in July, he has had continued anemia afterwards, there was a 

hematoma at the left hip which has improved, though anemia keeps recurring with 

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 

aplasia.





Anemia: Transfuse to hemoglobin greater than 7, however with hemoglobin of 7.2 

would likely give 1 unit prior to discharge





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 

ordered, these need to be drawn prior to discharge





Hip pain: Improving postoperatively





Disposition: Potentially after bone marrow biopsy, and unit transfusion and 

blood draw of parvo titers, can follow up as outpatient as needed





Thank you kindly, and please do not hesitate to call with any further questions.





OBJECTIVE


Vital Signs





Vital Signs








  Date Time  Temp Pulse Resp B/P (MAP) Pulse Ox O2 Delivery O2 Flow Rate FiO2


 


10/10/18 09:20  100 23  97 Nasal Cannula 2.0 


 


10/10/18 09:15  95 19  97 Nasal Cannula 2.0 


 


10/10/18 09:10  91 20  97 Nasal Cannula 2.0 


 


10/10/18 08:30      Room Air  


 


10/10/18 07:00 100.2 100 20 117/74 (88) 95 Room Air  





 100.2       


 


10/10/18 02:53 100.4 106 18 128/82 (97) 96 Room Air  





 100.4       


 


10/9/18 23:00 99.7 102 18 122/81 (95) 95 Room Air  





 99.7       


 


10/9/18 19:55      Room Air  


 


10/9/18 19:00 99.2 99 18 124/77 (93) 97 Room Air  





 99.2       


 


10/9/18 18:14 99.2 98 20 133/81 (98) 99 Room Air  





 99.2       


 


10/9/18 17:12 99.9 93 20 129/75    





 99.9       


 


10/9/18 16:20 98.6 92 20 123/76    





 98.6       


 


10/9/18 15:20 98.7 94 20 130/75    





 98.7       


 


10/9/18 14:35 98.7 91 20 110/66 (81) 96 Room Air  





 98.7       


 


10/9/18 14:34 98.7 91 20 110/66    





 98.7       


 


10/9/18 14:18 98.4 93 20 131/74    





 98.4       


 


10/9/18 12:35 98.3 88 20 121/79 (93) 99 Room Air  





 98.3       


 


10/9/18 11:34 98.0 85 20 115/72    





 98.0       


 


10/9/18 11:26 98.2 87 20 114/71    





 98.2       


 


10/9/18 10:26 98.0 88 16 111/68 (82) 98 Room Air  





 98.0       


 


10/9/18 10:25 98.0 88 16 111/68    





 98.0       


 


10/9/18 09:32 98.4 88 20 112/66    





 98.4       








I & O











Intake and Output 


 


 10/10/18





 07:00


 


Intake Total 2901 ml


 


Output Total 475 ml


 


Balance 2426 ml


 


 


 


Intake Oral 1380 ml


 


Blood Product IV Normal Saline Flush 1521 ml


 


Output Urine Total 475 ml


 


# Voids 1











COMMENT


Lab





Laboratory Tests








Test


 10/10/18


04:48


 


White Blood Count


 6.4 x10^3/uL


(4.0-11.0)


 


Red Blood Count


 2.40 x10^6/uL


(4.30-5.70)


 


Hemoglobin


 7.2 g/dL


(13.0-17.5)


 


Hematocrit


 20.5 %


(39.0-53.0)


 


Mean Corpuscular Volume 86 fL () 


 


Mean Corpuscular Hemoglobin 30 pg (25-35) 


 


Mean Corpuscular Hemoglobin


Concent 35 g/dL


(31-37)


 


Red Cell Distribution Width


 15.7 %


(11.5-14.5)


 


Platelet Count


 221 x10^3/uL


(140-400)

















JAKE DAVIDSON MD Oct 10, 2018 09:29

## 2018-10-10 NOTE — DISCH
DISCHARGE INSTRUCTIONS


Condition on Discharge


Condition on Discharge:  Guarded





Activity After Discharge


Activity Instructions for Disc:  Resume previous activity


Bathing Instructions:  Shower-keep dressing dry


Lifting Instructions after Dis:  No heavy lifting, No pulling or pushing


Exercise Instruction after Dis:  Walk 10 min, 3 x per day


Driving Instructions after Dis:  Do not drive





Diet after Discharge


Diet after Discharge:  Regular





Wound Incision Care


Wound/Incision Care:  Ice to area for comfort





Checks after Discharge


Checks after discharge:  Check blood press - daily, Check your Temp as needed





Contacting the DR. after DC


Call your doctor for:  If your condition worsens











TERRENCE RICARDO MD Oct 10, 2018 12:39

## 2018-10-12 LAB
B19V IGG SER-ACNC: 6.1 INDEX (ref 0–0.8)
B19V IGM SER-ACNC: 0.2 INDEX (ref 0–0.8)

## 2018-11-20 ENCOUNTER — HOSPITAL ENCOUNTER (OUTPATIENT)
Dept: HOSPITAL 61 - OPS | Age: 63
Discharge: HOME | End: 2018-11-20
Attending: INTERNAL MEDICINE
Payer: COMMERCIAL

## 2018-11-20 VITALS — SYSTOLIC BLOOD PRESSURE: 113 MMHG | DIASTOLIC BLOOD PRESSURE: 63 MMHG

## 2018-11-20 VITALS — DIASTOLIC BLOOD PRESSURE: 69 MMHG | SYSTOLIC BLOOD PRESSURE: 119 MMHG

## 2018-11-20 VITALS — SYSTOLIC BLOOD PRESSURE: 119 MMHG | DIASTOLIC BLOOD PRESSURE: 64 MMHG

## 2018-11-20 VITALS — DIASTOLIC BLOOD PRESSURE: 59 MMHG | SYSTOLIC BLOOD PRESSURE: 112 MMHG

## 2018-11-20 VITALS — DIASTOLIC BLOOD PRESSURE: 62 MMHG | SYSTOLIC BLOOD PRESSURE: 113 MMHG

## 2018-11-20 VITALS — DIASTOLIC BLOOD PRESSURE: 77 MMHG | SYSTOLIC BLOOD PRESSURE: 130 MMHG

## 2018-11-20 VITALS — SYSTOLIC BLOOD PRESSURE: 116 MMHG | DIASTOLIC BLOOD PRESSURE: 71 MMHG

## 2018-11-20 DIAGNOSIS — M19.90: ICD-10-CM

## 2018-11-20 DIAGNOSIS — D46.9: Primary | ICD-10-CM

## 2018-11-20 DIAGNOSIS — I10: ICD-10-CM

## 2018-11-20 DIAGNOSIS — K21.9: ICD-10-CM

## 2018-11-20 DIAGNOSIS — Z79.899: ICD-10-CM

## 2018-11-20 DIAGNOSIS — Z79.01: ICD-10-CM

## 2018-11-20 DIAGNOSIS — Z87.891: ICD-10-CM

## 2018-11-20 DIAGNOSIS — Z96.642: ICD-10-CM

## 2018-11-20 LAB
HCT VFR BLD CALC: 17.8 % (ref 39–53)
HGB BLD-MCNC: 6 G/DL (ref 13–17.5)
MCHC RBC AUTO-ENTMCNC: 34 G/DL (ref 31–37)

## 2018-11-20 PROCEDURE — 86901 BLOOD TYPING SEROLOGIC RH(D): CPT

## 2018-11-20 PROCEDURE — 85018 HEMOGLOBIN: CPT

## 2018-11-20 PROCEDURE — P9016 RBC LEUKOCYTES REDUCED: HCPCS

## 2018-11-20 PROCEDURE — 86920 COMPATIBILITY TEST SPIN: CPT

## 2018-11-20 PROCEDURE — 86850 RBC ANTIBODY SCREEN: CPT

## 2018-11-20 PROCEDURE — 36415 COLL VENOUS BLD VENIPUNCTURE: CPT

## 2018-11-20 PROCEDURE — 86900 BLOOD TYPING SEROLOGIC ABO: CPT

## 2018-11-20 PROCEDURE — 96374 THER/PROPH/DIAG INJ IV PUSH: CPT

## 2018-11-20 PROCEDURE — 36430 TRANSFUSION BLD/BLD COMPNT: CPT

## 2018-11-20 PROCEDURE — 85014 HEMATOCRIT: CPT

## 2019-07-11 ENCOUNTER — HOSPITAL ENCOUNTER (OUTPATIENT)
Dept: HOSPITAL 61 - US | Age: 64
Discharge: HOME | End: 2019-07-11
Attending: INTERNAL MEDICINE
Payer: COMMERCIAL

## 2019-07-11 DIAGNOSIS — N28.9: ICD-10-CM

## 2019-07-11 DIAGNOSIS — N28.1: Primary | ICD-10-CM

## 2019-07-11 DIAGNOSIS — I70.0: ICD-10-CM

## 2019-07-11 PROCEDURE — 76700 US EXAM ABDOM COMPLETE: CPT

## 2019-07-11 NOTE — RAD
ABDOMEN COMPLETE

 

History: Right upper quadrant pain, abnormal liver function tests

 

Comparison: None.

 

Findings:

Multiple sonographic images of the abdomen are submitted. There is no 

abnormality of the visualized pancreas. Abdominal aortic caliber is within

normal limits, scattered plaque. Right kidney measured 9.9 x 5.5 x 4.2 cm.

The left kidney measured 11.5 x 5.4 x 4.1 cm. There is no hydronephrosis 

of either kidney. There is a small hypoechoic lesion of the inferior right

kidney about 1.2 cm in greatest dimension not associated with vascularity 

on color Doppler imaging. Spleen measured about 10 cm. Common bile duct is

within normal limits at 0.4 cm. Hepatic echotexture is within normal 

limits, no focal hepatic lesion demonstrated by ultrasound. Right lobe of 

the liver measured 17.4 cm longitudinal. Gallbladder is present without 

intraluminal abnormality, wall thickening, pericholecystic fluid.

 

Impression: 

 

1.  Other than small inferior right renal cyst, other significant 

abnormality is demonstrated.

 

Electronically signed by: Anselmo Cummins MD (7/11/2019 10:15 AM) 

[email or web address]